# Patient Record
Sex: FEMALE | Race: WHITE | NOT HISPANIC OR LATINO | Employment: FULL TIME
[De-identification: names, ages, dates, MRNs, and addresses within clinical notes are randomized per-mention and may not be internally consistent; named-entity substitution may affect disease eponyms.]

---

## 2017-07-14 ENCOUNTER — RECORDS - HEALTHEAST (OUTPATIENT)
Dept: ADMINISTRATIVE | Facility: OTHER | Age: 32
End: 2017-07-14

## 2018-02-02 ENCOUNTER — RECORDS - HEALTHEAST (OUTPATIENT)
Dept: LAB | Facility: CLINIC | Age: 33
End: 2018-02-02

## 2018-02-05 LAB
BACTERIA SPEC CULT: ABNORMAL
BACTERIA SPEC CULT: ABNORMAL

## 2018-04-25 ENCOUNTER — RECORDS - HEALTHEAST (OUTPATIENT)
Dept: LAB | Facility: CLINIC | Age: 33
End: 2018-04-25

## 2018-04-25 LAB
BASOPHILS # BLD AUTO: 0 THOU/UL (ref 0–0.2)
BASOPHILS NFR BLD AUTO: 0 % (ref 0–2)
EOSINOPHIL # BLD AUTO: 0.1 THOU/UL (ref 0–0.4)
EOSINOPHIL NFR BLD AUTO: 1 % (ref 0–6)
ERYTHROCYTE [DISTWIDTH] IN BLOOD BY AUTOMATED COUNT: 12.3 % (ref 11–14.5)
HCT VFR BLD AUTO: 39.2 % (ref 35–47)
HGB BLD-MCNC: 12.9 G/DL (ref 12–16)
HIV 1+2 AB+HIV1 P24 AG SERPL QL IA: NEGATIVE
LYMPHOCYTES # BLD AUTO: 2.1 THOU/UL (ref 0.8–4.4)
LYMPHOCYTES NFR BLD AUTO: 22 % (ref 20–40)
MCH RBC QN AUTO: 30.5 PG (ref 27–34)
MCHC RBC AUTO-ENTMCNC: 32.9 G/DL (ref 32–36)
MCV RBC AUTO: 93 FL (ref 80–100)
MONOCYTES # BLD AUTO: 0.7 THOU/UL (ref 0–0.9)
MONOCYTES NFR BLD AUTO: 7 % (ref 2–10)
NEUTROPHILS # BLD AUTO: 6.6 THOU/UL (ref 2–7.7)
NEUTROPHILS NFR BLD AUTO: 70 % (ref 50–70)
PLATELET # BLD AUTO: 329 THOU/UL (ref 140–440)
PMV BLD AUTO: 10.8 FL (ref 8.5–12.5)
RBC # BLD AUTO: 4.23 MILL/UL (ref 3.8–5.4)
WBC: 9.4 THOU/UL (ref 4–11)

## 2018-04-26 LAB
ABO/RH(D): NORMAL
ABORH REPEAT: NORMAL
ANTIBODY SCREEN: NEGATIVE
BACTERIA SPEC CULT: NO GROWTH
HBV SURFACE AG SERPL QL IA: NEGATIVE
HCV AB SERPL QL IA: NEGATIVE
RUBV IGG SERPL QL IA: POSITIVE
T PALLIDUM AB SER QL: NEGATIVE

## 2018-05-25 ENCOUNTER — RECORDS - HEALTHEAST (OUTPATIENT)
Dept: LAB | Facility: CLINIC | Age: 33
End: 2018-05-25

## 2018-05-29 LAB
C TRACH DNA SPEC QL PROBE+SIG AMP: NEGATIVE
N GONORRHOEA DNA SPEC QL NAA+PROBE: NEGATIVE

## 2018-06-20 ENCOUNTER — RECORDS - HEALTHEAST (OUTPATIENT)
Dept: LAB | Facility: CLINIC | Age: 33
End: 2018-06-20

## 2018-06-22 LAB
AFP MOM: 0.67 MOM
ALPHA FETO PROTEIN: 23.9 NG/ML
CALCULATED AGE AT EDD: NORMAL
COLLECTION DATE: NORMAL
CURRENT CIGARETTE SMOKING STATUS: NORMAL
DOWN SYNDROME MATERNAL AGE RISK: NORMAL
DOWN SYNDROME SCREEN RISK ESTIMATE: NORMAL
EDD BY LMP: NORMAL
GA ON COLLECTION BY DATES: NORMAL WK,D
GA USED IN RISK ESTIMATE: NORMAL
GENERAL TEST INFORMATION: NORMAL
HCG, TOTAL MOM: 0.62 MOM
HCG, TOTAL: 24.3 IU/ML
INHIBIN MOM: 0.5 MOM
INHIBIN: 88 PG/ML
INITIAL OR REPEAT TESTING: NORMAL
INSULIN DIABETIC: NO
INTERPRETATION: NORMAL
IVF PREGNANCY: NO
Lab: NORMAL
NEURAL TUBE DEFECT RISK ESTIMATE: NORMAL
NUMBER OF CHORIONS: NORMAL
NUMBER OF FETUSES:: 1
PATIENT OR FATHER OF BABY HAS A NTD: NORMAL
PATIENT WEIGHT: 127 LBS
PHYSICIAN PHONE NUMBER: NORMAL
PREV DOWN (T21) / TRISOMY PREGNANCY: NO
PREV PREGNANCY W/ NEURAL TUBE DEFECT: NO
PT DATE OF BIRTH: NORMAL
RACE OF MOTHER: NORMAL
RECOMMENDED FOLLOW UP: NORMAL
TRISOMY 18 SCREEN RISK ESTIMATE: NORMAL
UE3 MOM: 1.17 MOM
UE3: 0.9 NG/ML

## 2018-08-14 ENCOUNTER — RECORDS - HEALTHEAST (OUTPATIENT)
Dept: ADMINISTRATIVE | Facility: OTHER | Age: 33
End: 2018-08-14

## 2018-08-21 ENCOUNTER — RECORDS - HEALTHEAST (OUTPATIENT)
Dept: LAB | Facility: CLINIC | Age: 33
End: 2018-08-21

## 2018-08-22 LAB — T PALLIDUM AB SER QL: NEGATIVE

## 2018-11-09 ENCOUNTER — RECORDS - HEALTHEAST (OUTPATIENT)
Dept: ADMINISTRATIVE | Facility: OTHER | Age: 33
End: 2018-11-09

## 2018-11-16 ENCOUNTER — RECORDS - HEALTHEAST (OUTPATIENT)
Dept: ADMINISTRATIVE | Facility: OTHER | Age: 33
End: 2018-11-16

## 2018-11-27 ENCOUNTER — RECORDS - HEALTHEAST (OUTPATIENT)
Dept: ADMINISTRATIVE | Facility: OTHER | Age: 33
End: 2018-11-27

## 2018-11-27 ENCOUNTER — ANESTHESIA - HEALTHEAST (OUTPATIENT)
Dept: OBGYN | Facility: CLINIC | Age: 33
End: 2018-11-27

## 2018-11-28 ENCOUNTER — SURGERY - HEALTHEAST (OUTPATIENT)
Dept: OBGYN | Facility: CLINIC | Age: 33
End: 2018-11-28

## 2018-11-28 ASSESSMENT — MIFFLIN-ST. JEOR: SCORE: 1387.81

## 2018-11-30 ENCOUNTER — HOME CARE/HOSPICE - HEALTHEAST (OUTPATIENT)
Dept: HOME HEALTH SERVICES | Facility: HOME HEALTH | Age: 33
End: 2018-11-30

## 2018-12-03 ENCOUNTER — HOME CARE/HOSPICE - HEALTHEAST (OUTPATIENT)
Dept: HOME HEALTH SERVICES | Facility: HOME HEALTH | Age: 33
End: 2018-12-03

## 2020-08-12 ENCOUNTER — RECORDS - HEALTHEAST (OUTPATIENT)
Dept: LAB | Facility: CLINIC | Age: 35
End: 2020-08-12

## 2020-08-12 LAB
CHOLEST SERPL-MCNC: 173 MG/DL
FASTING STATUS PATIENT QL REPORTED: NORMAL
HDLC SERPL-MCNC: 61 MG/DL
LDLC SERPL CALC-MCNC: 100 MG/DL
TRIGL SERPL-MCNC: 60 MG/DL

## 2020-08-13 LAB
HPV SOURCE: NORMAL
HUMAN PAPILLOMA VIRUS 16 DNA: NEGATIVE
HUMAN PAPILLOMA VIRUS 18 DNA: NEGATIVE
HUMAN PAPILLOMA VIRUS FINAL DIAGNOSIS: NORMAL
HUMAN PAPILLOMA VIRUS OTHER HR: NEGATIVE
SPECIMEN DESCRIPTION: NORMAL

## 2021-06-02 VITALS — HEIGHT: 66 IN | BODY MASS INDEX: 24.59 KG/M2 | WEIGHT: 153 LBS

## 2021-06-16 PROBLEM — Z87.59 CESAREAN DELIV DUE TO PREVIOUS DIFFICULT DELIV, DELIV, CURR HOSPITALIZ: Status: ACTIVE | Noted: 2018-11-28

## 2021-06-20 ENCOUNTER — HEALTH MAINTENANCE LETTER (OUTPATIENT)
Age: 36
End: 2021-06-20

## 2021-06-22 NOTE — ANESTHESIA CARE TRANSFER NOTE
Last vitals:   Vitals:    11/28/18 0901   BP: 115/62   Pulse: 78   Resp: 18   Temp: 36.7  C (98.1  F)   SpO2: 98%     Patient's level of consciousness is awake and drowsy  Spontaneous respirations: yes  Maintains airway independently: yes  Dentition unchanged: yes  Oropharynx: oropharynx clear of all foreign objects    QCDR Measures:  ASA# 20 - Surgical Safety Checklist: WHO surgical safety checklist completed prior to induction    PQRS# 430 - Adult PONV Prevention: 4558F-8P - Pt did NOT receive => 2 anti-emetic agents  ASA# 8 - Peds PONV Prevention: NA - Not pediatric patient, not GA or 2 or more risk factors NOT present  PQRS# 424 - [place]-op Temp Management: 4559F - At least one body temp DOCUMENTED => 35.5C or 95.9F within required timeframe  PQRS# 426 - PACU Transfer Protocol: - Transfer of care checklist used  ASA# 14 - Acute Post-op Pain: ASA14B - Patient did NOT experience pain >= 7 out of 10

## 2021-06-22 NOTE — ANESTHESIA PROCEDURE NOTES
Spinal Block    Patient location during procedure: OR  Start time: 11/28/2018 8:01 AM  End time: 11/28/2018 8:03 AM  Reason for block: at surgeon's request and primary anesthetic    Staffing:  Performing  Anesthesiologist: Zelalem Cain MD    Preanesthetic Checklist  Completed: patient identified, risks, benefits, and alternatives discussed, timeout performed, consent obtained, at patient's request, airway assessed, oxygen available, suction available, emergency drugs available and hand hygiene performed  Spinal Block  Patient position: sitting  Prep: ChloraPrep and site prepped and draped  Patient monitoring: heart rate, cardiac monitor, continuous pulse ox and blood pressure  Approach: midline  Location: L3-4  Injection technique: single-shot  Needle type: pencil-tip   Needle gauge: 24 G

## 2021-06-22 NOTE — ANESTHESIA PROCEDURE NOTES
Peripheral Block    Start time: 11/28/2018 8:43 AM  End time: 11/28/2018 8:47 AM  post-op analgesia per surgeon order as noted in medical record  Staffing:  Performing  Anesthesiologist: Zelalem Cain MD  Performing CRNA: King Norris CRNA  Preanesthetic Checklist  Completed: patient identified, site marked, risks, benefits, and alternatives discussed, timeout performed, consent obtained, at patient's request, airway assessed, oxygen available, suction available, emergency drugs available and hand hygiene performed  Peripheral Block  Block type: other, TAP  Prep: ChloraPrep  Patient position: supine  Patient monitoring: cardiac monitor, continuous pulse oximetry, heart rate and blood pressure  Laterality: bilateral, same technique used bilaterally  Injection technique: ultrasound guided    Ultrasound used to visualize needle placement in proximity to nerve being blocked: yes   Permanent ultrasound image captured for medical record  Sterile gel and probe cover used for ultrasound.    Needle  Needle type: Stimuplex   Needle gauge: 20G  Needle length: 4 in  no peripheral nerve catheter placed  Assessment  Injection assessment: no difficulty with injection, negative aspiration for heme, no paresthesia on injection and incremental injection

## 2021-06-22 NOTE — ANESTHESIA POSTPROCEDURE EVALUATION
Patient: Margi Cisneros  REPEAT  SECTION  Anesthesia type: spinal    Patient location: Labor and Delivery  Last vitals:   Vitals:    18 1131   BP:    Pulse: 64   Resp:    Temp:    SpO2: 98%     Post vital signs: stable  Level of consciousness: awake and responds to simple questions  Post-anesthesia pain: pain controlled  Post-anesthesia nausea and vomiting: no  Pulmonary: unassisted, return to baseline  Cardiovascular: stable and blood pressure at baseline  Hydration: adequate  Anesthetic events: no    QCDR Measures:  ASA# 11 - [place]-op Cardiac Arrest: ASA11B - Patient did NOT experience unanticipated cardiac arrest  ASA# 12 - [place]-op Mortality Rate: ASA12B - Patient did NOT die  ASA# 13 - PACU Re-Intubation Rate: NA - No ETT / LMA used for case  ASA# 10 - Composite Anes Safety: ASA10A - No serious adverse event    Additional Notes:

## 2021-06-22 NOTE — ANESTHESIA PREPROCEDURE EVALUATION
Anesthesia Evaluation      Patient summary reviewed   No history of anesthetic complications     Airway   Mallampati: I  Neck ROM: full   Pulmonary - negative ROS and normal exam    breath sounds clear to auscultation                         Cardiovascular - negative ROS  Rhythm: regular  Rate: normal,         Neuro/Psych - negative ROS     Endo/Other    (+) pregnant (Repeat CS Elective)     GI/Hepatic/Renal - negative ROS           Dental - normal exam                        Anesthesia Plan  Planned anesthetic: spinal  TAP block post closure  ASA 2     Anesthetic plan and risks discussed with: patient, spouse and parent/guardian  Anesthesia plan special considerations: antiemetics,   Post-op plan: routine recovery

## 2021-10-11 ENCOUNTER — HEALTH MAINTENANCE LETTER (OUTPATIENT)
Age: 36
End: 2021-10-11

## 2022-07-17 ENCOUNTER — HEALTH MAINTENANCE LETTER (OUTPATIENT)
Age: 37
End: 2022-07-17

## 2022-08-08 ENCOUNTER — LAB REQUISITION (OUTPATIENT)
Dept: LAB | Facility: CLINIC | Age: 37
End: 2022-08-08
Payer: COMMERCIAL

## 2022-08-08 DIAGNOSIS — R63.5 ABNORMAL WEIGHT GAIN: ICD-10-CM

## 2022-08-08 LAB
CHOLEST SERPL-MCNC: 206 MG/DL
HDLC SERPL-MCNC: 74 MG/DL
LDLC SERPL CALC-MCNC: 120 MG/DL
NONHDLC SERPL-MCNC: 132 MG/DL
TRIGL SERPL-MCNC: 60 MG/DL
TSH SERPL DL<=0.005 MIU/L-ACNC: 1.53 UIU/ML (ref 0.3–4.2)

## 2022-08-08 PROCEDURE — 84443 ASSAY THYROID STIM HORMONE: CPT | Mod: ORL | Performed by: FAMILY MEDICINE

## 2022-08-08 PROCEDURE — 80061 LIPID PANEL: CPT | Mod: ORL | Performed by: FAMILY MEDICINE

## 2022-09-25 ENCOUNTER — HEALTH MAINTENANCE LETTER (OUTPATIENT)
Age: 37
End: 2022-09-25

## 2022-10-04 ENCOUNTER — MEDICAL CORRESPONDENCE (OUTPATIENT)
Dept: HEALTH INFORMATION MANAGEMENT | Facility: CLINIC | Age: 37
End: 2022-10-04

## 2022-10-13 ENCOUNTER — TRANSCRIBE ORDERS (OUTPATIENT)
Dept: OTHER | Age: 37
End: 2022-10-13

## 2022-10-13 DIAGNOSIS — Z80.41 FAMILY HISTORY OF MALIGNANT NEOPLASM OF OVARY: Primary | ICD-10-CM

## 2023-08-05 ENCOUNTER — HEALTH MAINTENANCE LETTER (OUTPATIENT)
Age: 38
End: 2023-08-05

## 2024-09-26 ENCOUNTER — HOSPITAL ENCOUNTER (OUTPATIENT)
Facility: CLINIC | Age: 39
Setting detail: OBSERVATION
Discharge: HOME | End: 2024-09-27
Attending: EMERGENCY MEDICINE | Admitting: EMERGENCY MEDICINE
Payer: COMMERCIAL

## 2024-09-26 ENCOUNTER — APPOINTMENT (OUTPATIENT)
Dept: MRI IMAGING | Facility: CLINIC | Age: 39
End: 2024-09-26
Attending: EMERGENCY MEDICINE
Payer: COMMERCIAL

## 2024-09-26 ENCOUNTER — APPOINTMENT (OUTPATIENT)
Dept: CT IMAGING | Facility: CLINIC | Age: 39
End: 2024-09-26
Attending: EMERGENCY MEDICINE
Payer: COMMERCIAL

## 2024-09-26 DIAGNOSIS — H54.3 VISION LOSS, BILATERAL: ICD-10-CM

## 2024-09-26 DIAGNOSIS — H53.2 DOUBLE VISION: ICD-10-CM

## 2024-09-26 DIAGNOSIS — G45.9 TIA (TRANSIENT ISCHEMIC ATTACK): ICD-10-CM

## 2024-09-26 PROBLEM — G43.909 MIGRAINE HEADACHE: Status: ACTIVE | Noted: 2024-09-26

## 2024-09-26 PROBLEM — F41.9 ANXIETY: Status: ACTIVE | Noted: 2024-09-26

## 2024-09-26 PROBLEM — N83.209 CYST OF OVARY: Status: ACTIVE | Noted: 2024-09-26

## 2024-09-26 PROBLEM — N92.6 IRREGULAR PERIODS: Status: ACTIVE | Noted: 2024-09-26

## 2024-09-26 LAB
ANION GAP SERPL CALCULATED.3IONS-SCNC: 14 MMOL/L (ref 7–15)
ATRIAL RATE - MUSE: 77 BPM
BUN SERPL-MCNC: 8.5 MG/DL (ref 6–20)
CALCIUM SERPL-MCNC: 9.9 MG/DL (ref 8.8–10.4)
CHLORIDE SERPL-SCNC: 103 MMOL/L (ref 98–107)
CHOLEST SERPL-MCNC: 220 MG/DL
CREAT SERPL-MCNC: 0.59 MG/DL (ref 0.51–0.95)
DIASTOLIC BLOOD PRESSURE - MUSE: NORMAL MMHG
EGFRCR SERPLBLD CKD-EPI 2021: >90 ML/MIN/1.73M2
ERYTHROCYTE [DISTWIDTH] IN BLOOD BY AUTOMATED COUNT: 12 % (ref 10–15)
EST. AVERAGE GLUCOSE BLD GHB EST-MCNC: 117 MG/DL
GLUCOSE SERPL-MCNC: 93 MG/DL (ref 70–99)
HBA1C MFR BLD: 5.7 %
HCG UR QL: NEGATIVE
HCO3 SERPL-SCNC: 24 MMOL/L (ref 22–29)
HCT VFR BLD AUTO: 38.1 % (ref 35–47)
HDLC SERPL-MCNC: 62 MG/DL
HGB BLD-MCNC: 13.1 G/DL (ref 11.7–15.7)
HOLD SPECIMEN: NORMAL
HOLD SPECIMEN: NORMAL
INTERPRETATION ECG - MUSE: NORMAL
LDLC SERPL CALC-MCNC: 136 MG/DL
MCH RBC QN AUTO: 29.4 PG (ref 26.5–33)
MCHC RBC AUTO-ENTMCNC: 34.4 G/DL (ref 31.5–36.5)
MCV RBC AUTO: 86 FL (ref 78–100)
NONHDLC SERPL-MCNC: 158 MG/DL
P AXIS - MUSE: 54 DEGREES
PLATELET # BLD AUTO: 339 10E3/UL (ref 150–450)
POTASSIUM SERPL-SCNC: 4 MMOL/L (ref 3.4–5.3)
PR INTERVAL - MUSE: 142 MS
QRS DURATION - MUSE: 86 MS
QT - MUSE: 386 MS
QTC - MUSE: 436 MS
R AXIS - MUSE: 60 DEGREES
RBC # BLD AUTO: 4.45 10E6/UL (ref 3.8–5.2)
SODIUM SERPL-SCNC: 141 MMOL/L (ref 135–145)
SYSTOLIC BLOOD PRESSURE - MUSE: NORMAL MMHG
T AXIS - MUSE: 62 DEGREES
TRIGL SERPL-MCNC: 108 MG/DL
TROPONIN T SERPL HS-MCNC: <6 NG/L
VENTRICULAR RATE- MUSE: 77 BPM
WBC # BLD AUTO: 6.8 10E3/UL (ref 4–11)

## 2024-09-26 PROCEDURE — G0378 HOSPITAL OBSERVATION PER HR: HCPCS

## 2024-09-26 PROCEDURE — 250N000013 HC RX MED GY IP 250 OP 250 PS 637: Performed by: EMERGENCY MEDICINE

## 2024-09-26 PROCEDURE — 93005 ELECTROCARDIOGRAM TRACING: CPT | Performed by: INTERNAL MEDICINE

## 2024-09-26 PROCEDURE — 70496 CT ANGIOGRAPHY HEAD: CPT

## 2024-09-26 PROCEDURE — 93005 ELECTROCARDIOGRAM TRACING: CPT | Performed by: EMERGENCY MEDICINE

## 2024-09-26 PROCEDURE — 36415 COLL VENOUS BLD VENIPUNCTURE: CPT | Performed by: EMERGENCY MEDICINE

## 2024-09-26 PROCEDURE — 99285 EMERGENCY DEPT VISIT HI MDM: CPT | Mod: 25

## 2024-09-26 PROCEDURE — 80048 BASIC METABOLIC PNL TOTAL CA: CPT | Performed by: EMERGENCY MEDICINE

## 2024-09-26 PROCEDURE — 84484 ASSAY OF TROPONIN QUANT: CPT | Performed by: EMERGENCY MEDICINE

## 2024-09-26 PROCEDURE — 255N000002 HC RX 255 OP 636: Performed by: EMERGENCY MEDICINE

## 2024-09-26 PROCEDURE — 81025 URINE PREGNANCY TEST: CPT | Performed by: EMERGENCY MEDICINE

## 2024-09-26 PROCEDURE — 120N000001 HC R&B MED SURG/OB

## 2024-09-26 PROCEDURE — A9585 GADOBUTROL INJECTION: HCPCS | Performed by: EMERGENCY MEDICINE

## 2024-09-26 PROCEDURE — 80061 LIPID PANEL: CPT | Performed by: INTERNAL MEDICINE

## 2024-09-26 PROCEDURE — 250N000011 HC RX IP 250 OP 636: Performed by: EMERGENCY MEDICINE

## 2024-09-26 PROCEDURE — 85027 COMPLETE CBC AUTOMATED: CPT | Performed by: EMERGENCY MEDICINE

## 2024-09-26 PROCEDURE — 70498 CT ANGIOGRAPHY NECK: CPT

## 2024-09-26 PROCEDURE — 70553 MRI BRAIN STEM W/O & W/DYE: CPT

## 2024-09-26 PROCEDURE — 99222 1ST HOSP IP/OBS MODERATE 55: CPT | Performed by: INTERNAL MEDICINE

## 2024-09-26 PROCEDURE — 83036 HEMOGLOBIN GLYCOSYLATED A1C: CPT | Performed by: INTERNAL MEDICINE

## 2024-09-26 RX ORDER — ASPIRIN 81 MG/1
81 TABLET, CHEWABLE ORAL DAILY
Status: DISCONTINUED | OUTPATIENT
Start: 2024-09-27 | End: 2024-09-26

## 2024-09-26 RX ORDER — IOPAMIDOL 755 MG/ML
75 INJECTION, SOLUTION INTRAVASCULAR ONCE
Status: COMPLETED | OUTPATIENT
Start: 2024-09-26 | End: 2024-09-26

## 2024-09-26 RX ORDER — ASPIRIN 81 MG/1
81 TABLET, CHEWABLE ORAL ONCE
Status: COMPLETED | OUTPATIENT
Start: 2024-09-26 | End: 2024-09-26

## 2024-09-26 RX ORDER — GADOBUTROL 604.72 MG/ML
6.5 INJECTION INTRAVENOUS ONCE
Status: COMPLETED | OUTPATIENT
Start: 2024-09-26 | End: 2024-09-26

## 2024-09-26 RX ORDER — CLOPIDOGREL BISULFATE 75 MG/1
300 TABLET ORAL ONCE
Status: COMPLETED | OUTPATIENT
Start: 2024-09-26 | End: 2024-09-26

## 2024-09-26 RX ORDER — SODIUM CHLORIDE 9 MG/ML
INJECTION, SOLUTION INTRAVENOUS CONTINUOUS
Status: DISCONTINUED | OUTPATIENT
Start: 2024-09-26 | End: 2024-09-26

## 2024-09-26 RX ORDER — LIDOCAINE 40 MG/G
CREAM TOPICAL
Status: DISCONTINUED | OUTPATIENT
Start: 2024-09-26 | End: 2024-09-27 | Stop reason: HOSPADM

## 2024-09-26 RX ORDER — ASPIRIN 325 MG
325 TABLET ORAL ONCE
Status: DISCONTINUED | OUTPATIENT
Start: 2024-09-26 | End: 2024-09-26

## 2024-09-26 RX ORDER — ASPIRIN 81 MG/1
81 TABLET, CHEWABLE ORAL DAILY
Status: DISCONTINUED | OUTPATIENT
Start: 2024-09-27 | End: 2024-09-27 | Stop reason: HOSPADM

## 2024-09-26 RX ORDER — CLOPIDOGREL BISULFATE 75 MG/1
75 TABLET ORAL DAILY
Status: DISCONTINUED | OUTPATIENT
Start: 2024-09-27 | End: 2024-09-27 | Stop reason: HOSPADM

## 2024-09-26 RX ORDER — ASPIRIN 81 MG/1
81 TABLET ORAL DAILY
Status: DISCONTINUED | OUTPATIENT
Start: 2024-09-27 | End: 2024-09-26

## 2024-09-26 RX ORDER — ESCITALOPRAM OXALATE 10 MG/1
10 TABLET ORAL AT BEDTIME
COMMUNITY

## 2024-09-26 RX ADMIN — GADOBUTROL 6.5 ML: 604.72 INJECTION INTRAVENOUS at 23:30

## 2024-09-26 RX ADMIN — CLOPIDOGREL BISULFATE 300 MG: 75 TABLET ORAL at 21:47

## 2024-09-26 RX ADMIN — IOPAMIDOL 75 ML: 755 INJECTION, SOLUTION INTRAVENOUS at 18:37

## 2024-09-26 RX ADMIN — ASPIRIN 81 MG CHEWABLE TABLET 81 MG: 81 TABLET CHEWABLE at 21:47

## 2024-09-26 ASSESSMENT — COLUMBIA-SUICIDE SEVERITY RATING SCALE - C-SSRS
6. HAVE YOU EVER DONE ANYTHING, STARTED TO DO ANYTHING, OR PREPARED TO DO ANYTHING TO END YOUR LIFE?: NO
2. HAVE YOU ACTUALLY HAD ANY THOUGHTS OF KILLING YOURSELF IN THE PAST MONTH?: NO
1. IN THE PAST MONTH, HAVE YOU WISHED YOU WERE DEAD OR WISHED YOU COULD GO TO SLEEP AND NOT WAKE UP?: NO

## 2024-09-26 ASSESSMENT — ACTIVITIES OF DAILY LIVING (ADL)
ADLS_ACUITY_SCORE: 35
ADLS_ACUITY_SCORE: 35

## 2024-09-26 NOTE — ED PROVIDER NOTES
EMERGENCY DEPARTMENT ENCOUNTER      NAME: Margi Cisneros  AGE: 39 year old female  YOB: 1985  MRN: [id number]  EVALUATION DATE & TIME: No admission date for patient encounter.    PCP: Tucker Rich    ED PROVIDER: Mario Reyes M.D.      Chief Complaint   Patient presents with    Loss of Vision         FINAL IMPRESSION:  1. TIA (transient ischemic attack)    2. Vision loss, bilateral    3. Double vision          ED COURSE & MEDICAL DECISION MAKING:    Pertinent Labs & Imaging studies reviewed. (See chart for details)  ED Course as of 09/26/24 2122   Thu Sep 26, 2024   1752 Patient is a 39-year-old woman that I was called out to triage to see out of concern for stroke.  She presents with sudden onset of painless bilateral vision loss that works like curtains falling down.  She had subsequent 10 to 15 minutes of horizontal diplopia, and that is also subsided.  She is asymptomatic currently.  NIH stroke scale of 0 on arrival.  Given bilateral nature of symptoms, concerning for central etiology.  Large aneurysm, mass in the differential although given her symptoms seem to come on very abruptly.  She has no headache right now.  We will start with a CT/CTA of head and neck and then reach out to stroke neurology for any recommendations   1807 HCG Qual Urine: Negative   1822 Troponin T, High Sensitivity: <6   1822 Basic metabolic panel  WNL   1914 EKG shows sinus rhythm with a rate of 77.  No acute ischemic ST or T wave morphology.  Normal axis, normal intervals, no prior EKG for comparison.  Impression: Sinus rhythm without evidence of acute ischemia   2008 CTA Head Neck with Contrast  HEAD CT:  1.  No acute intracranial process.     HEAD CTA:   1.  No significant stenosis, aneurysm, or high flow vascular malformation identified.  2.  Variant [place] anatomy as above.     NECK CTA:  1.  Normal neck CTA.   2045 I spoke to stroke neuro   Patient will be admitted to the hospital for TIA.  MRI  will be obtained, this does not need to be done emergently overnight, but can wait till 6 AM when the day crew arrived.  This was communicated over to the MRI department.  In the meantime, I ordered aspirin and Plavix per recommendations from stroke neurology.  They placed a note for further recommendations.     Medical Decision Making  Obtained supplemental history:Supplemental history obtained?: No  Reviewed external records: Documented in chart  Care impacted by chronic illness:Documented in Chart  Care significantly affected by social determinants of health:N/A  Did you consider but not order tests?: Work up considered but not performed and documented in chart, if applicable  Did you interpret images independently?: Independent interpretation of ECG and images noted in documentation, when applicable.  Consultation discussion with other provider: See documentation for phone consultations  Admit.    Not Applicable        MEDICATIONS GIVEN IN THE EMERGENCY:  Medications   clopidogrel (PLAVIX) tablet 300 mg (has no administration in time range)   aspirin (ASA) chewable tablet 81 mg (has no administration in time range)   lidocaine 1 % 0.1-1 mL (has no administration in time range)   lidocaine (LMX4) cream (has no administration in time range)   sodium chloride (PF) 0.9% PF flush 3 mL (has no administration in time range)   sodium chloride (PF) 0.9% PF flush 3 mL (has no administration in time range)   Medication Instruction - Avoid dextrose in IV solutions (has no administration in time range)   sodium chloride 0.9 % infusion (has no administration in time range)   clopidogrel (PLAVIX) tablet 75 mg (has no administration in time range)   niCARdipine 40 mg in 200 mL NS (CARDENE) infusion (has no administration in time range)   iopamidol (ISOVUE-370) solution 75 mL (75 mLs Intravenous $Given 9/26/24 0439)         NEW PRESCRIPTIONS STARTED AT TODAY'S ER VISIT  New Prescriptions    No medications on file       "    =================================================================    HPI      Margi Cisneros is a 39 year old female who presents to this ED for evaluation of visual changes.  At 1530, 2-1/2 hours ago, patient felt sudden onset of bilateral vision loss that she experienced as curtains dropping suddenly.  She did not feel dizzy, lightheaded, have a headache or any other symptoms at that time.  She had to sit down, blink, and the vision slowly improved over the next couple of minutes.  However, for the next 10 to 30 minutes she had double vision, oriented horizontally.  She has never had symptoms like this before.  She wears contact lenses.  After the double vision improved, she has remained asymptomatic.    She takes escitalopram, no other medications.  Otherwise healthy.        VITALS:  BP (!) 146/90   Pulse 93   Temp 99.1  F (37.3  C) (Temporal)   Resp 18   Ht 1.664 m (5' 5.5\")   Wt 65.8 kg (145 lb)   SpO2 98%   BMI 23.76 kg/m      PHYSICAL EXAM    Constitutional: Calm, comfortable  HENT: Normocephalic, atraumatic, mucous membranes moist, nose normal  Eyes: PERRL, EOMI, conjunctiva normal, no discharge.  Neck- Supple, gross ROM intact.   Respiratory: Normal work of breathing, normal rate, speaks in full sentences  Cardiovascular: Normal heart rate  Musculoskeletal: Moving all 4 extremities intentionally and without pain.  Neurologic: CN II-XII intact. 5/5 strength in upper and lower extremities. No dysarthria or dysphagia. Sensation to light touch intact in all 4 extremities. No pronator drift.  Normal finger-nose-finger. Steady gait.  Psychiatric: Affect normal, cooperative.    National Institutes of Health Stroke Scale  Exam Interval: Baseline   Score    Level of consciousness: (0)   Alert, keenly responsive    LOC questions: (0)   Answers both questions correctly    LOC commands: (0)   Performs both tasks correctly    Best gaze: (0)   Normal    Visual: (0)   No visual loss    Facial palsy: (0)   " Normal symmetrical movements    Motor arm (left): (0)   No drift    Motor arm (right): (0)   No drift    Motor leg (left): (0)   No drift    Motor leg (right): (0)   No drift    Limb ataxia: (0)   Absent    Sensory: (0)   Normal- no sensory loss    Best language: (0)   Normal- no aphasia    Dysarthria: (0)   Normal    Extinction and inattention: (0)   No abnormality        Total Score:  0          LAB:  All pertinent labs reviewed and interpreted.  Labs Ordered and Resulted from Time of ED Arrival to Time of ED Departure   CBC WITH PLATELETS - Normal       Result Value    WBC Count 6.8      RBC Count 4.45      Hemoglobin 13.1      Hematocrit 38.1      MCV 86      MCH 29.4      MCHC 34.4      RDW 12.0      Platelet Count 339     BASIC METABOLIC PANEL - Normal    Sodium 141      Potassium 4.0      Chloride 103      Carbon Dioxide (CO2) 24      Anion Gap 14      Urea Nitrogen 8.5      Creatinine 0.59      GFR Estimate >90      Calcium 9.9      Glucose 93     TROPONIN T, HIGH SENSITIVITY - Normal    Troponin T, High Sensitivity <6     HCG QUALITATIVE URINE - Normal    hCG Urine Qualitative Negative     GLUCOSE MONITOR NURSING POCT   GLUCOSE MONITOR NURSING POCT   HEMOGLOBIN A1C   LIPID REFLEX TO DIRECT LDL PANEL       RADIOLOGY:  Reviewed all pertinent imaging. Please see official radiology report.  CTA Head Neck with Contrast   Final Result   IMPRESSION:    HEAD CT:   1.  No acute intracranial process.      HEAD CTA:    1.  No significant stenosis, aneurysm, or high flow vascular malformation identified.   2.  Variant [place] anatomy as above.      NECK CTA:   1.  Normal neck CTA.      MR Brain w/o & w Contrast    (Results Pending)       EKG:    All EKG interpretations will be found in ED course above.      Mario Reyes M.D.  Emergency Medicine  [facility] EMERGENCY ROOM  [address] [address] [phone]  Dept:  [phone]       Mario Reyes MD  09/26/24 6493

## 2024-09-26 NOTE — ED TRIAGE NOTES
Pt was at ice cream store standing and noticed her vision blackened like a curtain dropped in front of both eyes with no other symptoms. Vision was gone for approx 30 seconds and then returned as double vision lasting approx 5-10 min with pockets of black. Evaluated at that time by PA friend who didn't see any signs of stroke. Vision now back to normal. Called her eye doctor office and optometrist responded since vision returned and no spots anymore to continue to monitor for flashes, dark black spots to call back.  Called PCP office and told to go to ER now. Pt alert. No HA, no nausea, no dizziness, no weakness, no fatigue, no CP, SOB.      Triage Assessment (Adult)       Row Name 09/26/24 6030          Triage Assessment    Airway WDL WDL        Respiratory WDL    Respiratory WDL WDL        Skin Circulation/Temperature WDL    Skin Circulation/Temperature WDL WDL        Cardiac WDL    Cardiac WDL WDL        Peripheral/Neurovascular WDL    Peripheral Neurovascular WDL WDL        Cognitive/Neuro/Behavioral WDL    Cognitive/Neuro/Behavioral WDL WDL

## 2024-09-27 ENCOUNTER — APPOINTMENT (OUTPATIENT)
Dept: CARDIOLOGY | Facility: CLINIC | Age: 39
End: 2024-09-27
Payer: COMMERCIAL

## 2024-09-27 VITALS
WEIGHT: 145 LBS | BODY MASS INDEX: 23.3 KG/M2 | HEART RATE: 76 BPM | OXYGEN SATURATION: 98 % | TEMPERATURE: 98 F | RESPIRATION RATE: 20 BRPM | SYSTOLIC BLOOD PRESSURE: 113 MMHG | HEIGHT: 66 IN | DIASTOLIC BLOOD PRESSURE: 64 MMHG

## 2024-09-27 LAB
ANION GAP SERPL CALCULATED.3IONS-SCNC: 14 MMOL/L (ref 7–15)
APTT PPP: 31 SECONDS (ref 22–38)
BUN SERPL-MCNC: 7.8 MG/DL (ref 6–20)
CALCIUM SERPL-MCNC: 9.1 MG/DL (ref 8.8–10.4)
CHLORIDE SERPL-SCNC: 102 MMOL/L (ref 98–107)
CREAT SERPL-MCNC: 0.58 MG/DL (ref 0.51–0.95)
EGFRCR SERPLBLD CKD-EPI 2021: >90 ML/MIN/1.73M2
ERYTHROCYTE [DISTWIDTH] IN BLOOD BY AUTOMATED COUNT: 11.9 % (ref 10–15)
GLUCOSE BLDC GLUCOMTR-MCNC: 91 MG/DL (ref 70–99)
GLUCOSE SERPL-MCNC: 94 MG/DL (ref 70–99)
HCO3 SERPL-SCNC: 24 MMOL/L (ref 22–29)
HCT VFR BLD AUTO: 37.9 % (ref 35–47)
HGB BLD-MCNC: 12.8 G/DL (ref 11.7–15.7)
INR PPP: 1.04 (ref 0.85–1.15)
MCH RBC QN AUTO: 29.5 PG (ref 26.5–33)
MCHC RBC AUTO-ENTMCNC: 33.8 G/DL (ref 31.5–36.5)
MCV RBC AUTO: 87 FL (ref 78–100)
PLATELET # BLD AUTO: 329 10E3/UL (ref 150–450)
POTASSIUM SERPL-SCNC: 4 MMOL/L (ref 3.4–5.3)
RBC # BLD AUTO: 4.34 10E6/UL (ref 3.8–5.2)
SODIUM SERPL-SCNC: 140 MMOL/L (ref 135–145)
WBC # BLD AUTO: 6 10E3/UL (ref 4–11)

## 2024-09-27 PROCEDURE — 999N000208 ECHOCARDIOGRAM COMPLETE

## 2024-09-27 PROCEDURE — 36415 COLL VENOUS BLD VENIPUNCTURE: CPT | Performed by: INTERNAL MEDICINE

## 2024-09-27 PROCEDURE — 82962 GLUCOSE BLOOD TEST: CPT

## 2024-09-27 PROCEDURE — 93306 TTE W/DOPPLER COMPLETE: CPT | Mod: 26 | Performed by: INTERNAL MEDICINE

## 2024-09-27 PROCEDURE — 250N000013 HC RX MED GY IP 250 OP 250 PS 637: Performed by: INTERNAL MEDICINE

## 2024-09-27 PROCEDURE — 85610 PROTHROMBIN TIME: CPT | Performed by: INTERNAL MEDICINE

## 2024-09-27 PROCEDURE — 99239 HOSP IP/OBS DSCHRG MGMT >30: CPT | Performed by: HOSPITALIST

## 2024-09-27 PROCEDURE — 85730 THROMBOPLASTIN TIME PARTIAL: CPT | Performed by: INTERNAL MEDICINE

## 2024-09-27 PROCEDURE — 80048 BASIC METABOLIC PNL TOTAL CA: CPT | Performed by: INTERNAL MEDICINE

## 2024-09-27 PROCEDURE — G0378 HOSPITAL OBSERVATION PER HR: HCPCS

## 2024-09-27 PROCEDURE — G0426 INPT/ED TELECONSULT50: HCPCS | Mod: G0

## 2024-09-27 PROCEDURE — 85027 COMPLETE CBC AUTOMATED: CPT | Performed by: INTERNAL MEDICINE

## 2024-09-27 RX ORDER — ASPIRIN 81 MG/1
81 TABLET, CHEWABLE ORAL DAILY
Qty: 30 TABLET | Refills: 0 | Status: SHIPPED | OUTPATIENT
Start: 2024-09-28

## 2024-09-27 RX ORDER — CLOPIDOGREL BISULFATE 75 MG/1
75 TABLET ORAL DAILY
Qty: 21 TABLET | Refills: 0 | Status: SHIPPED | OUTPATIENT
Start: 2024-09-28 | End: 2024-10-19

## 2024-09-27 RX ADMIN — ASPIRIN 81 MG CHEWABLE TABLET 81 MG: 81 TABLET CHEWABLE at 10:21

## 2024-09-27 RX ADMIN — CLOPIDOGREL BISULFATE 75 MG: 75 TABLET ORAL at 10:22

## 2024-09-27 ASSESSMENT — ACTIVITIES OF DAILY LIVING (ADL)
ADLS_ACUITY_SCORE: 35

## 2024-09-27 NOTE — INTERIM SUMMARY
"[facility]    Brief Stroke Telephone Note    See stroke consult note from earlier today for HPI and recommendations. Additional recommendations below:     - As discussed with patient, recommend holding off on initiating hormone replacement therapy pending additional TIA work up    Case discussed with vascular neurology attending Dr. Farias.    Kim Jensen PA-C  Vascular Neurology    To page me or covering stroke neurology team member, click here: AMCOM  Choose \"On Call\" tab at top, then select \"NEUROLOGY/ALL SITES\" from middle drop-down box, press Enter, then look for \"stroke\" or \"telestroke\" for your site.   "

## 2024-09-27 NOTE — PHARMACY-ADMISSION MEDICATION HISTORY
Pharmacist Admission Medication History    Admission medication history is complete. The information provided in this note is only as accurate as the sources available at the time of the update.    Information Source(s): Patient and CareEverywhere/SureScripts via in-person    Pertinent Information:     Changes made to PTA medication list:  Added: esccitalopram   Deleted: ibuprofen, oxycodone, prenatal vitamins  Changed: None    Allergies reviewed with patient and updates made in EHR: yes    Medication History Completed By: Lucia Toribio RPH 9/26/2024 9:29 PM    PTA Med List   Medication Sig Last Dose    escitalopram (LEXAPRO) 10 MG tablet Take 10 mg by mouth at bedtime. 9/25/2024 at pm

## 2024-09-27 NOTE — PROGRESS NOTES
Physical Therapy: Orders received. Chart reviewed and discussed with care team.? Physical Therapy not indicated due to RN reports pt has no mobility/PT concerns. Pt does not need PT services.? Defer discharge recommendations to nursing and care team.? Will complete orders.   Blessing Saucedo, PT, DPT

## 2024-09-27 NOTE — PLAN OF CARE
Problem: Adult Inpatient Plan of Care  Goal: Absence of Hospital-Acquired Illness or Injury  Intervention: Prevent Skin Injury  Recent Flowsheet Documentation  Taken 9/27/2024 0457 by Gaby Woodard RN  Body Position:   turned   position changed independently     Problem: Adult Inpatient Plan of Care  Goal: Absence of Hospital-Acquired Illness or Injury  Intervention: Prevent Infection  Recent Flowsheet Documentation  Taken 9/27/2024 0457 by Gaby Woodard, RN  Infection Prevention: cohorting utilized   Patient's vitals stable on RA. Denies any pain. Patient's vision has returned to normal per patient report. Plan for neurology consult later today.

## 2024-09-27 NOTE — PROGRESS NOTES
09/27/24 0900   Appointment Info   Signing Clinician's Name / Credentials (SLP) RASHID Trujillo   Appointment Canceled Reason (SLP) Other (see Cancel Comments row)   Appointment Cancel Comments (SLP) Stroke Pathway orders recieved and appreciated. Per chart review and nurse interview, no need for SLP evaluation, therefore it will be deferred.

## 2024-09-27 NOTE — CONSULTS
"[facility]    Stroke Consult Note    Reason for Consult:  Loss of vision    Chief Complaint: Loss of Vision     HPI  Margi Cisneros is a right handed 39 year old female with a PMH significant for migraine and anxiety. Margi presented on 9/26 with new visual disturbance.  Margi reports that  yesterday she was at an ice cream shop and when attempting to use her phone to pay at the counter she noted new vision loss as if a black curtain felll in front of both eyes.  She denies any change in head position prior to the onset of symptoms. It is estimated this black curtain vision loss lasted 30 seconds before suddenly resolving.  Denied seeing the curtain gradually lift up.  After resolving she noted her vision came back with new double vision and black spots.  She noted horizontal double vision occurred in her right eye and had new black spots in her left eye.  She described her vision changes as \"camera lens zooming in and out as if it was trying to focused.\"  She estimates these visual changes occurred for 5 to 10 minutes before resolving. She endorsed associated symptoms of feeling disoriented and panic.  She denied any associated lightheadedness, eye pain, nausea, unilateral weakness, numbness, changes in her speech, or associated headache during or following the onset of symptoms.  She also denied chest pain, shortness of breath, palpitation. She endorsed a brief episode of room spinning sensation. She was able to ambulate independently back to a chair to sit down.  She denies prior episodes of her presenting symptoms. Not on aspirin or statin PTA. Presenting BP was 146/90.    Today, Margi endorses continued resolution of her visual symptoms and denies any new neurologic symptoms.  Regarding her prior history of headache, she endorses 1 migraine per month over the last 4 months.  She denies any associated aura.  The pain is global and does not localize to one side.  Pain is typically " relieved with Advil.    Reviewed Vascular Risk Factors:   - Tobacco use: Non-smoker.   - Family history of stroke/TIA: Maternal grandmother had a TIA in ~81 yo   - Family hx of blood clot: Father had a PE  that was provoked after a surgery   - Family hx or personal history of recurrent miscarriage: No   - No current hormone replacement usage, though is undergoing an endocrinologic workup for premature menopause and is considering future hormone replacement therapy  - Diet: Breakfast: Toast; lunch: Apple, cheese stick, crackers; Dinner: varies. Noted she did not eat much food yesterday.  - Exercise: Typically exercise 3-4 times a week at the gym, cardio/weight lifting.   - Blood pressure: No known hx   - Diabetes: No known hx   - Atrial fibrillation: Father has a hx of atrial fibrillation which was reportedly diagnosed at a young age, No personal history of A-fib  - B symptoms: Denies unintentional weight loss, fatigue, night sweats.   - Endorses fever/chills due to recent cold this past Saturday which resolved on Sunday.   - Pre-cancer screenings: Up to date on Mammogram   - MARILEE Screening questions: Denies snoring or daytime sleepiness    Neuro Evaluation Summarized  MRI and/or Head CT MRI Brain: No acute infarct    Intracranial Vasculature CTA Head: No LVO or significant stenosis   Cervical Vasculature CTA Neck: No LVO or significant stenosis      Echocardiogram Normal left ventricular size and systolic performance. EF is 60%. No significant valvular heart disease is identified on this study. Normal right ventricular size and systolic performance. Negative bubble. Left atrium and right atrium normal.    EKG/Telemetry Sinus rhythm   Possible Left atrial enlargement   Cannot rule out Anterior infarct , age undetermined    Other Testing Not Applicable      LDL 9/26/2024: 136 mg/dL   A1C 9/26/2024: 5.7 %     ABCD2 Patients Score   Age ? 60 years 1 point 0   Blood Pressure    SBP ? 140 or DBP ?  90    1 point 1    Clinical Features    - Unilateral weakness    - Speech disturbance w/o weakness    - Other    2 points  1 point    0 points 0   Duration of symptoms    ? 60 minutes    10-59 minutes    < 10 minutes   2 points  1 point  0 points 1   Diabetes  1 point 0   Patient s ABCD2 Score (0-7) = 2     Impression  Episode of bilateral transient vision loss followed by monocular horizontal double vision and monocular black spots, unclear etiology. Differential includes transient global hypoperfusion (sudden drop in BP or transient arrhythmia) vs transient ischemic attack vs atypical migraine (?brainstem aura) vs seizure. However, given vascular risk factors recommend treating as TIA and optimizing vascular risk factors and reassessing for recurrence of symptoms and continuation of anti-platelet therapy at follow up      Recommendations   - Neuro checks and vital signs every 4 hours  - Normotension   - Long term goal BP is <130/80 to be achieved as outpatient within several weeks. Tighter control associated with improved vascular outcomes; recommend home blood pressure monitoring and keeping a BP log for primary care follow up  - Continue Plavix 75 mg and aspirin 81mg daily together for 21 days; then aspirin 81 mg alone indefinitely, recommend reassessing need for aspirin continuation at follow up   -  (goal 40-70); Patient has recent discussion with her PCP regarding holding off on statin initiation pending additional work up, defer to PCP.   - Hgb A1c 5.7%, (goal <7% for secondary stroke prevention) regarding pre-diabetes   - Encourage Mediterranean diet and daily exercise  - Euthermia, Euglycemia   - Education: Reviewed [facility] stroke warning signs with Maren  - Reviewed patient's plan for upcoming travel with Dr. Farias who recommends avoiding travel for 1 month   - 14 day cardiac event monitor (Zio Patch) to be mailed to patient, (ordered)    Diagnostic testing  - Continue telemetry while  "inpatient    Patient Follow-up    - in the next 1-2 week(s) with PCP  - in 6-8 weeks with any stroke CHETAN or general neurology ([phone]) (ordered)  - Recommend follow up with outpatient ophthalmology for formal eye exam and visual field testing    Thank you for this consult. No further stroke evaluation is recommended, so we will sign off. Please contact us with any additional questions.    Kim Jensen PA-C  Vascular Neurology    To page me or covering stroke neurology team member, click here: [facility]  Choose \"On Call\" tab at top, then select \"NEUROLOGY/ALL SITES\" from middle drop-down box, press Enter, then look for \"stroke\" or \"telestroke\" for your site.  _____________________________________________________    Clinically Significant Risk Factors Present on Admission                                        Past Medical History    No past medical history on file.  Medications   Home Meds  Prior to Admission medications    Medication Sig Start Date End Date Taking? Authorizing Provider   escitalopram (LEXAPRO) 10 MG tablet Take 10 mg by mouth at bedtime.   Yes Unknown, Entered By History       Scheduled Meds  Current Facility-Administered Medications   Medication Dose Route Frequency Provider Last Rate Last Admin    aspirin (ASA) chewable tablet 81 mg  81 mg Oral Daily Guanaco Del Rosario MD   81 mg at 09/27/24 1021    clopidogrel (PLAVIX) tablet 75 mg  75 mg Oral or NG Tube Daily Guanaco Del Rosario MD   75 mg at 09/27/24 1022    sodium chloride (PF) 0.9% PF flush 3 mL  3 mL Intracatheter Q8H Guanaco Del Rosario MD   3 mL at 09/27/24 0459       Infusion Meds  Current Facility-Administered Medications   Medication Dose Route Frequency Provider Last Rate Last Admin    Medication Instruction - Avoid dextrose in IV solutions   Intravenous Continuous PRN Guanaco Del Rosario MD        niCARdipine 40 mg in 200 mL NS (CARDENE) infusion  2.5-15 mg/hr Intravenous Continuous PRN Guanaco Del Rosario MD           Allergies   No " Known Allergies       PHYSICAL EXAMINATION   Temp:  [98.2  F (36.8  C)-99.1  F (37.3  C)] 98.3  F (36.8  C)  Pulse:  [] 76  Resp:  [16-34] 34  BP: (113-146)/(64-90) 113/64  SpO2:  [97 %-99 %] 98 %    General Exam  General:  patient lying in bed without any acute distress    HEENT:  normocephalic/atraumatic  Pulmonary:  no respiratory distress    Neuro Exam  Mental Status:  alert, oriented to age and month, follows commands, speech clear and fluent, naming and repetition normal  Cranial Nerves:  visual fields intact (tested by nurse), EOMI with normal smooth pursuit, facial sensation intact and symmetric (tested by nurse), facial movements symmetric, hearing not formally tested but intact to conversation, no dysarthria, shoulder shrug equal bilaterally, tongue protrusion midline  Motor:  no abnormal movements, able to move all limbs antigravity spontaneously with no signs of hemiparesis observed, no pronator drift   Reflexes:  unable to test (telestroke)  Sensory:  light touch sensation intact and symmetric throughout upper and lower extremities (assessed by nurse), no extinction on double simultaneous stimulation (assessed by nurse)  Coordination:  normal finger-to-nose and heel-to-shin bilaterally without dysmetria, rapid alternating movements symmetric, no orbiting with arm rolling   Station/Gait:  unable to test due to telestroke    Stroke Scales  NIHSS  1a. Level of Consciousness 0-->Alert, keenly responsive   1b. LOC Questions 0-->Answers both questions correctly   1c. LOC Commands 0-->Performs both tasks correctly   2.   Best Gaze 0-->Normal   3.   Visual 0-->No visual loss   4.   Facial Palsy 0-->Normal symmetrical movements   5a. Motor Arm, Left 0-->No drift, limb holds 90 (or 45) degrees for full 10 secs   5b. Motor Arm, Right 0-->No drift, limb holds 90 (or 45) degrees for full 10 secs   6a. Motor Leg, Left 0-->No drift, leg holds 30 degree position for full 5 secs   6b. Motor Leg, right 0-->No  "drift, leg holds 30 degree position for full 5 secs   7.   Limb Ataxia 0-->Absent   8.   Sensory 0-->Normal, no sensory loss   9.   Best Language 0-->No aphasia, normal   10. Dysarthria 0-->Normal   11. Extinction and Inattention  0-->No abnormality   Total 0 (09/27/24 1100)     Imaging  I personally reviewed all imaging; relevant findings per HPI.    Labs Data   CBC  Recent Labs   Lab 09/27/24  0507 09/26/24  1751   WBC 6.0 6.8   RBC 4.34 4.45   HGB 12.8 13.1   HCT 37.9 38.1    339     Basic Metabolic Panel   Recent Labs   Lab 09/27/24  1205 09/27/24  0507 09/26/24  1751   NA  --  140 141   POTASSIUM  --  4.0 4.0   CHLORIDE  --  102 103   CO2  --  24 24   BUN  --  7.8 8.5   CR  --  0.58 0.59   GLC 91 94 93   AQUILES  --  9.1 9.9     Liver Panel  No results for input(s): \"PROTTOTAL\", \"ALBUMIN\", \"BILITOTAL\", \"ALKPHOS\", \"AST\", \"ALT\", \"BILIDIRECT\" in the last 168 hours.  INR    Recent Labs   Lab Test 09/27/24  0507   INR 1.04           Stroke Consult Data Data   Telestroke Service Details  (for non-emergent stroke consult with tele)  Video start time 09/27/24   1108   Video end time 09/27/24   1151   Type of service telemedicine diagnostic assessment of acute neurological changes   Reason telemedicine is appropriate patient requires assessment with a specialist for diagnosis and treatment of neurological symptoms   Mode of transmission secure interactive audio and video communication per Leo   Originating site (patient location) [facility]    Distant site (provider location) [facility]       I have personally spent a total of 75 minutes providing care today, time spent in reviewing medical records and devising the plan as recorded above.   "

## 2024-09-27 NOTE — H&P
[facility]    History and Physical - Hospitalist Service       Date of Admission:  9/26/2024    Assessment & Plan    Active Problems:    Anxiety    TIA (transient ischemic attack)    Double vision    Vision loss, bilateral      Margi Cisneros is a 39 year old female admitted on 9/26/2024. She has a history of migraine headaches, anxiety, a who presented to the ED with complaints of transient and irregular periods with early menopause currently undergoing workup with endocrinologist who presented today due to complaints of transient bilateral visual loss followed by about 5 to 10-minute episode of double vision.  Patient reports that she was standing in line this afternoon around 230 to 3 PM at an ice [employer] store when her vision went black like a curtain had been pulled in front of her eye.  States that she had a brief episode where she felt like a spinning sensation.  States that that lasted maybe less than a second.  Reports that the visual loss lasted for about 30 seconds but subsequently she had double vision.  Reports that she went to sit down and try not to panic because she had children with her.  States that her friend who is a physician assistant came and assessed  head.  She reports that she had what appears to be peripheral vision loss.  She called her  PCP's elizabeth line and she was instructed to come to the ED.  She denies any focal weakness, loss of sensation, chest pain, speech deficits, shortness of breath, nausea, vomiting, diarrhea, constipation, dysuria, or any other new symptoms.  She reports that she has been dealing with some stressors including work and also a friend who completed suicide recently but there has been nothing that is overwhelming.      #Acute CVA: Likely TIA vs other neurological process like global hypoperfusion. Patient presented with complaints of transient bilateral visual loss followed by about 5 to 10-minute episode of diplopia.. CT brain showed no  acute intracranial findings.  CT was negative for any significant stenosis, admission, or high flow vascular malformation.  Neurochecks   Permissive HTN; goal SBP < 220 mmHg  Daily aspirin 81 mg   Plavix (clopidogrel) 75 mg PO Daily  Statin: atorvastatin 40  MRI Brain with and without contrast   Echocardiogram  Telemetry, EKG  Strict glycemic control   Check Hb A1c and Lipid Panel   PT and OT to evaluate and treat  Speech therapy consult     Neurology Consult  NPO until patient passes bedside swallow eval or speech therapy swallow eval.     # Anxiety:  Continue escitalopram        Diet: NPO for Medical/Clinical Reasons Except for: No Exceptions    DVT Prophylaxis: Ambulate every shift  Mario Catheter: Not present  Lines: None     Cardiac Monitoring: ACTIVE order. Indication: ICU  Code Status: Full Code      Clinically Significant Risk Factors Present on Admission                                     Disposition Plan     Medically Ready for Discharge: Anticipated Tomorrow         Guanaco Del Rosario MD  Hospitalist Service  [facility]  Securely message with [employer] (more info)  Text page via [facility] Paging/Directory     ______________________________________________________________________    Chief Complaint   Bilateral vision loss followed by double vision.     History is obtained from the patient    History of Present Illness   Margi Cisneros is a 39 year old female with medical history significant for of migraine headaches, anxiety, a who presented to the ED with complaints of transient and irregular periods with early menopause currently undergoing workup with endocrinologist who presented today due to complaints of transient bilateral visual loss followed by about 5 to 10-minute episode of double vision.  Patient reports that she was standing in line this afternoon around 230 to 3 PM at an ice cream store when her vision went black like a curtain had been pulled in front of her eye.  States  that she had a brief episode where she felt like a spinning sensation.  States that that lasted maybe less than a second.  Reports that the visual loss lasted for about 30 seconds but subsequently she had double vision.  Reports that she went to sit down and try not to panic because she had children with her.  States that her friend who is a physician assistant came and assessed  head.  She reports that she had what appears to be peripheral vision loss.  She called her  PCP's elizabeth line and she was instructed to come to the ED.  She denies any focal weakness, loss of sensation, chest pain, speech deficits, shortness of breath, nausea, vomiting, diarrhea, constipation, dysuria, or any other new symptoms.  She reports that she has been dealing with some stressors including work and also a friend who completed suicide recently but there has been nothing that is overwhelming.    She reports occasional alcohol intake.  Denies any tobacco or illicit drug use.      Past Medical History    No past medical history on file.    Past Surgical History   Past Surgical History:   Procedure Laterality Date     SECTION N/A 2018    Procedure: REPEAT  SECTION;  Surgeon: Tim Spann MD;  Location: [facility] L+D OR;  Service: Obstetrics    OTHER SURGICAL HISTORY Left     ATHROSCOPYKnee scoped       Prior to Admission Medications   Prior to Admission Medications   Prescriptions Last Dose Informant Patient Reported? Taking?   ibuprofen (ADVIL,MOTRIN) 600 MG tablet   No No   Sig: [IBUPROFEN (ADVIL,MOTRIN) 600 MG TABLET] Take 1 tablet (600 mg total) by mouth every 6 (six) hours as needed for pain.   oxyCODONE (ROXICODONE) 5 MG immediate release tablet   No No   Sig: [OXYCODONE (ROXICODONE) 5 MG IMMEDIATE RELEASE TABLET] Take 1 tablet (5 mg total) by mouth every 6 (six) hours as needed for pain.   prenatal #115-iron-folic acid 29 mg iron- 1 mg Chew   Yes No   Sig: [PRENATAL #115-IRON-FOLIC ACID 29 MG IRON- 1 MG  CHEW] Chew 1 tablet daily.      Facility-Administered Medications: None        Review of Systems    As per HPI otherwise 14 point review of systems is negative.    Physical Exam   Vital Signs: Temp: 99.1  F (37.3  C) Temp src: Temporal BP: (!) 146/90 Pulse: 93   Resp: 18 SpO2: 98 % O2 Device: None (Room air)    Weight: 145 lbs 0 oz    General: AAOx3, NAD, pleasant.  HEENT: NCAT, pupils are equal and round, anicteric, oral mucosa is moist.  Neck: Supple,  Cardiac: RRR.  No murmur. No rub or gallop.  Respiratory: CTAB, no crackles, wheezes, rales, or rhonchi  Abdomen: Soft, NT, ND, + bowel sounds  Extremity: No LE edema, distal pulses palpable.   Neuro: AAO x3, CN II-XII grossly intact, 5/5 motor function in bilateral upper and lower extremities.  Sensation is intact.  PERRL, EOMI,  Psych: Calm and cooperative.       Medical Decision Making       >60 MINUTES SPENT BY ME on the date of service doing chart review, history, exam, documentation & further activities per the note.      Data     I have personally reviewed the following data over the past 24 hrs:    6.8  \   13.1   / 339     141 103 8.5 /  93   4.0 24 0.59 \     Trop: <6 BNP: N/A     TSH: N/A T4: N/A A1C: 5.7 (H)       Imaging results reviewed over the past 24 hrs:   Recent Results (from the past 24 hour(s))   CTA Head Neck with Contrast    Narrative    EXAM: CTA HEAD NECK W CONTRAST  LOCATION: [facility]  DATE/TIME: 9/26/2024 6:41 PM CDT    INDICATION: TIA with bilateral vision loss and subsequent horizontal diplopia  COMPARISON: None.  CONTRAST: 75 ML ISOVUE 370  TECHNIQUE: Head and neck CT angiogram with IV contrast. Noncontrast head CT followed by axial helical CT images of the head and neck vessels obtained during the arterial phase of intravenous contrast administration. Axial 2D reconstructed images and   multiplanar 3D MIP reconstructed images of the head and neck vessels were performed by the technologist. Dose reduction  techniques were used. All stenosis measurements made according to NASCET criteria unless otherwise specified.    FINDINGS:   NONCONTRAST HEAD CT:   INTRACRANIAL CONTENTS: No intracranial hemorrhage, extraaxial collection, or mass effect.  No CT evidence of acute infarct. Normal parenchymal attenuation. Normal ventricles and sulci.     VISUALIZED ORBITS/SINUSES/MASTOIDS: No intraorbital abnormality. No significant paranasal sinus mucosal disease. No middle ear or mastoid effusion.    BONES/SOFT TISSUES: No acute abnormality.    HEAD CTA:  ANTERIOR CIRCULATION: No stenosis/occlusion, aneurysm, or high flow vascular malformation. Fetal origin of both posterior cerebral arteries from the anterior circulation.    POSTERIOR CIRCULATION: No stenosis/occlusion, aneurysm, or high flow vascular malformation. Congenitally small vertebrobasilar system.     DURAL VENOUS SINUSES: Expected enhancement of the major dural venous sinuses.    NECK CTA:  RIGHT CAROTID: No measurable stenosis or dissection.    LEFT CAROTID: No measurable stenosis or dissection.    VERTEBRAL ARTERIES: No focal stenosis or dissection. Balanced vertebral arteries.    AORTIC ARCH: Classic aortic arch anatomy with no significant stenosis at the origin of the great vessels.    NONVASCULAR STRUCTURES: Unremarkable.      Impression    IMPRESSION:   HEAD CT:  1.  No acute intracranial process.    HEAD CTA:   1.  No significant stenosis, aneurysm, or high flow vascular malformation identified.  2.  Variant [place] anatomy as above.    NECK CTA:  1.  Normal neck CTA.

## 2024-09-27 NOTE — PROGRESS NOTES
OT: Orders received. Chart reviewed and discussed with care team.  OT not indicated due to no skilled need for OT identified.  Defer discharge recommendations to RN/Care Team.  Will complete orders.

## 2024-09-27 NOTE — PROGRESS NOTES
Hospitalist follow up note:    Doing well this morning. Neuro symptoms resolved. Will await echo, neuro recs, hopefully discharge to home later today.    Db Allan DO   Pager #: [phone]

## 2024-09-27 NOTE — DISCHARGE SUMMARY
[facility] MEDICINE  DISCHARGE SUMMARY     Primary Care Physician: Jackelyn Doran  Admission Date: 9/26/2024   Discharge Provider: Db Allan DO Discharge Date: 9/27/2024   Diet:   Active Diet and Nourishment Order   Procedures    Regular Diet Adult    Diet       Code Status: Full Code   Activity: As tolerated        Condition at Discharge: Stable     REASON FOR PRESENTATION(See Admission Note for Details)   Acute bilateral vision loss, diplopia  PRINCIPAL & ACTIVE DISCHARGE DIAGNOSES   Complex migraine versus TIA  PENDING LABS     Unresulted Labs Ordered in the Past 30 Days of this Admission       No orders found for last 31 day(s).          PROCEDURES ( this hospitalization only)      RECOMMENDATIONS TO OUTPATIENT PROVIDER FOR F/U VISIT   Follow-up Appointments     Follow-up and recommended labs and tests       Follow up with primary care provider, Jackelyn Doran, within 7 days   for hospital follow- up.  No follow up labs or test are needed.          Continue with risk reduction, statin, aspirin  Monitor lipids, hemoglobin A1c as an outpatient  DISPOSITION   Home  SUMMARY OF HOSPITAL COURSE:    39-year-old female who is otherwise healthy presented with symptoms of acute vision loss in both eyes while at an ice cream shop with her children.  Symptoms lasted about 30 seconds and when she regained her vision she had some diplopia and possible peripheral vision loss.  Symptoms resolved by the time she was in the ED. Imaging including MRI, CTA head and neck, echo were all unremarkable. Seen by tele stroke team who recommended ASA and plavix for 3 weeks, aspirin to continue. Statin was recommended but deferred until pt can follow up with primary to discuss. Ambulatory cardiac monitor ordered and will be mailed to her house for monitoring for A fib.     She did have elevated lipids and mildly elevated A1c. These risk factors should also be discussed at follow up visit.    Discharge Medications with Med changes:     Current Discharge Medication List        START taking these medications    Details   aspirin (ASA) 81 MG chewable tablet Take 1 tablet (81 mg) by mouth daily.  Qty: 30 tablet, Refills: 0    Associated Diagnoses: TIA (transient ischemic attack)      clopidogrel (PLAVIX) 75 MG tablet Take 1 tablet (75 mg) by mouth or NG Tube daily for 21 days.  Qty: 21 tablet, Refills: 0    Associated Diagnoses: TIA (transient ischemic attack)           CONTINUE these medications which have NOT CHANGED    Details   escitalopram (LEXAPRO) 10 MG tablet Take 10 mg by mouth at bedtime.           Rationale for medication changes:    Started asa and plavix for possibility that this was TIA  Recommended statin but pt will discuss with her primary  Consults   Neurology  Anticoagulation Information    N/a  SIGNIFICANT IMAGING FINDINGS     Results for orders placed or performed during the hospital encounter of 09/26/24   CTA Head Neck with Contrast    Impression    IMPRESSION:   HEAD CT:  1.  No acute intracranial process.    HEAD CTA:   1.  No significant stenosis, aneurysm, or high flow vascular malformation identified.  2.  Variant [place] anatomy as above.    NECK CTA:  1.  Normal neck CTA.   MR Brain w/o & w Contrast    Impression    IMPRESSION:  1.  No acute intracranial process.  2.  Minimal presumed microvascular ischemic changes as detailed above.     SIGNIFICANT LABORATORY FINDINGS   See EMR  Discharge Orders        Reason for your hospital stay    Acute vision changes due to complex migraine or transient ischemic attack.     Follow-up and recommended labs and tests     Follow up with primary care provider, Jackelyn Doran, within 7 days for hospital follow- up.  No follow up labs or test are needed.     Activity    Your activity upon discharge: activity as tolerated. No travel for one month.     Diet    Follow this diet upon discharge: Current Diet:Orders Placed This  Encounter      Regular Diet Adult     Stroke Hospital Follow Up (for neurologist use only)    [facility] [place] will call you to coordinate care as prescribed by your provider. If you don t hear from a representative within 2 business days, please call [phone].       ROLANDA HAN MAIL OUT     Examination   Physical Exam   Temp:  [98  F (36.7  C)-99.1  F (37.3  C)] 98  F (36.7  C)  Pulse:  [] 76  Resp:  [16-24] 20  BP: (113-146)/(64-90) 113/64  SpO2:  [97 %-99 %] 98 %  Wt Readings from Last 1 Encounters:   09/26/24 65.8 kg (145 lb)       Subjective: feeling fine this morning. No longer having any vision issues, no headache.     Physical Exam:    Gen: no acute distress, comfortable  ENT: no scleral icterus  Pulm: lungs are clear without wheezing, crackles  CV: regular rate and rhythm  GI: abdomen is soft, non-tender, non-distended with active bowel sounds.  MSK: no significant peripheral pitting edema, no noticeable swelling/erythema or warmth of joints.  Derm: no rashes on examined areas of skin, no jaundice, skin is dry and warm.   Psych: appropriate affect       Please see EMR for more detailed significant labs, imaging, consultant notes etc.    IDb DO, personally saw the patient today and spent greater than 30 minutes discharging this patient.    Db Allan DO  [facility]    CC:Jackelyn Doran

## 2024-09-27 NOTE — PROGRESS NOTES
"PRIMARY DIAGNOSIS: \"GENERIC\" NURSING  OUTPATIENT/OBSERVATION GOALS TO BE MET BEFORE DISCHARGE:  ADLs back to baseline: {YES / NO:920285::\"Yes\"}    Activity and level of assistance: {OBSAmbulationStatus:150164}    Pain status: {OBSPainStatus:884347}    Return to near baseline physical activity: {YES / NO:129717::\"Yes\"}     Discharge Planner Nurse   Safe discharge environment identified: {YES / NO:633335::\"Yes\"}  Barriers to discharge: {YES / NO:888933::\"Yes\"}       Entered by: CLAUDIO VIGIL RN 09/27/2024 2:17 PM     Please review provider order for any additional goals.   Nurse to notify provider when observation goals have been met and patient is ready for discharge.  "

## 2024-09-27 NOTE — CONSULTS
"[facility]    Stroke Telephone Note    I was called by Mario Reyes on 09/26/24 regarding patient Margi Cisneros. The patient is a 39 year old female who had a 30 second episode of bilateral painless blindness followed by 5-10 minutes of double vision (unclear monocular or binocular). In the ED, her neurological exam is normal.     Vitals  BP: (!) 146/90   Pulse: 93   Resp: 18   Temp: 99.1  F (37.3  C)   Weight: 65.8 kg (145 lb)    Imaging Findings  CT head: no acute pathology  CTA head/neck: no stenosis or occlusion    Impression  Transient vision loss. The bilaterality argues against focal brain ischemia and makes more more suspicious of transient global hypoperfusion (e.g. a drop in BP or transient arrhythmia) However, the double vision is hard to explain. I can't rule out TIA.     Recommendations  - Use orderset: \"Ischemic Stroke Routine Admission\" or \"Ischemic Stroke No Thrombolytics/No Thrombectomy ICU Admission\"  - Place Neurology IP Stroke Consult order   - Neurochecks and Vital Signs every 4 hours   - Permissive HTN; goal SBP < 220 mmHg  - Daily aspirin 81 mg for secondary stroke prevention  - Plavix (clopidogrel) 300 mg PO loading dose x 1  - Plavix (clopidogrel) 75 mg PO Daily  - Statin: atorvastatin 40  - MRI Brain with and without contrast   - TTE (with Bubble Study if age 60 yrs or less)  - Telemetry, EKG  - Bedside Glucose Monitoring  - A1c, Lipid Panel, Troponin x 3  - PT/OT/SLP  - Stroke Education  - Euthermia, Euglycemia    My recommendations are based on the information provided over the phone by Margi Cisneros's in-person providers. They are not intended to replace the clinical judgment of her in-person providers. I was not requested to personally see or examine the patient at this time.         Sheila Gallardo MD, Msc, FAHA, FAAN   of Neurology  [facility]     09/26/2024 8:49 PM  To page me or covering stroke neurology team member, " "click here: [facility]COM  Choose \"On Call\" tab at top, then search dropdown box for \"Neurology Adult\" & press Enter, look for Neuro ICU/Stroke      "

## 2024-09-28 ENCOUNTER — ORDERS ONLY (AUTO-RELEASED) (OUTPATIENT)
Dept: EMERGENCY MEDICINE | Facility: CLINIC | Age: 39
End: 2024-09-28
Payer: COMMERCIAL

## 2024-09-28 ENCOUNTER — HEALTH MAINTENANCE LETTER (OUTPATIENT)
Age: 39
End: 2024-09-28

## 2024-09-28 DIAGNOSIS — H54.3 VISION LOSS, BILATERAL: ICD-10-CM

## 2024-09-28 DIAGNOSIS — H53.2 DOUBLE VISION: ICD-10-CM

## 2024-10-25 ENCOUNTER — HOSPITAL ENCOUNTER (OUTPATIENT)
Dept: ULTRASOUND IMAGING | Facility: CLINIC | Age: 39
Discharge: HOME | End: 2024-10-25
Attending: INTERNAL MEDICINE
Payer: COMMERCIAL

## 2024-10-25 ENCOUNTER — HOSPITAL ENCOUNTER (OUTPATIENT)
Dept: MRI IMAGING | Facility: CLINIC | Age: 39
Discharge: HOME | End: 2024-10-25
Attending: INTERNAL MEDICINE
Payer: COMMERCIAL

## 2024-10-25 DIAGNOSIS — E28.319 PREMATURE MENOPAUSE: ICD-10-CM

## 2024-10-25 PROCEDURE — 70553 MRI BRAIN STEM W/O & W/DYE: CPT

## 2024-10-25 PROCEDURE — 255N000002 HC RX 255 OP 636: Performed by: INTERNAL MEDICINE

## 2024-10-25 PROCEDURE — 76856 US EXAM PELVIC COMPLETE: CPT

## 2024-10-25 PROCEDURE — A9585 GADOBUTROL INJECTION: HCPCS | Performed by: INTERNAL MEDICINE

## 2024-10-25 PROCEDURE — 93248 EXT ECG>7D<15D REV&INTERPJ: CPT | Performed by: INTERNAL MEDICINE

## 2024-10-25 RX ORDER — GADOBUTROL 604.72 MG/ML
7 INJECTION INTRAVENOUS ONCE
Status: COMPLETED | OUTPATIENT
Start: 2024-10-25 | End: 2024-10-25

## 2024-10-25 RX ADMIN — GADOBUTROL 7 ML: 604.72 INJECTION INTRAVENOUS at 10:46

## 2024-11-26 NOTE — PROGRESS NOTES
"__________________________________________________________      [facility] Brenda Diaz   [phone]  __________________________________________________________         History of Present Illness   Chief Complaint: Patient presents with:  RECHECK: Last Thursday when she woke up she got a little room spinning and has now been consistently once a day for just a moment.      Margi Cisneros is a 39 year old female presenting for follow-up for possible TIA.     She was hospitalized at [facility] on 9/26/24. Prior to the hospital stay, she had a past medical history of migraine and anxiety.    She presented to the hospital with transient visual changes.  She was on ice cream shop with family when she developed sudden onset loss of vision in both eyes, as if \"a black curtain fell\".  This lasted approximately 30 seconds before resolving.  When vision returned, she describes seeing \"5-10\" images of the  and had spotty \"tunnels of black\" across her entire visual field. This lasted 5-10 minutes before improving. There was no associated focal weakness, numbness, speech difficulty, impaired gait. She denies associated headache.     MRI brain was negative for acute stroke.  Due to concern for possible complex migraine versus TIA, she was started on aspirin plus Plavix x 21 days.    Since leaving the hospital, she denies recurrent episodes of visual changes.  She has had episodes of brief dizziness on waking and other minor episodes of dizziness after bending over to tie shoes or do laundry.  She followed up with ophthalmology with reportedly normal ocular examination.  She shares that she has had 3 lifetime \"migraines\" that were so severe she had to lay down in the dark.  She does get more frequent minor headaches and these seem to be increasing in frequency recently.    She is following with her PCP and endocrinology for possible premature menopause, with possible plans to explore " "hormone replacement therapy.    Modified Breckinridge Scale  Score: 0-No symptoms    Stroke Evaluation Summarized:  New results resulted and reviewed by me today are in BOLD below.  I personally reviewed the following neuroimaging studies today and the comments above reflect my own personal interpretation of the images: images: CTA head/neck, MRI brain, and I also showed CTA, MRI to the patient myself today.    MRI and/or Head CT MRI Brain: No acute infarct    Intracranial Vasculature CTA Head: No LVO or significant stenosis   Cervical Vasculature CTA Neck: No LVO or significant stenosis      Echocardiogram Normal left ventricular size and systolic performance. EF is 60%. No significant valvular heart disease is identified on this study. Normal right ventricular size and systolic performance. Negative bubble. Left atrium and right atrium normal    EKG/Telemetry Sinus rhythm   Possible Left atrial enlargement   Cannot rule out Anterior infarct , age undetermined    Other Testing [place]iop[place]: no afib     Labs Lab Results   Component Value Date     (H) 09/26/2024    A1C 5.7 (H) 09/26/2024    CTROPT <6 09/26/2024    INR 1.04 09/27/2024               Home Medications     Current Outpatient Medications   Medication Sig Dispense Refill    aspirin (ASA) 81 MG chewable tablet Take 1 tablet (81 mg) by mouth daily. 30 tablet 0    escitalopram (LEXAPRO) 10 MG tablet Take 10 mg by mouth at bedtime.       No current facility-administered medications for this visit.            Physical Examination     Physical Exam    Estimated body mass index is 23.76 kg/m  as calculated from the following:    Height as of 9/26/24: 1.664 m (5' 5.5\").    Weight as of 9/26/24: 65.8 kg (145 lb).    /77   Pulse 80   SpO2 98%        General Exam  General: Sitting up in chair in no acute distress  Pulmonary:  no respiratory distress    Neurologic:  Mental Status:  alert, oriented x 3, follows commands, speech clear and fluent, naming and " repetition normal  Cranial Nerves:  visual fields intact, PERRL, EOMI with normal smooth pursuit, facial sensation intact and symmetric, facial movements symmetric, hearing not formally tested but intact to conversation, palate elevation symmetric and uvula midline, no dysarthria, shoulder shrug strong bilaterally, tongue protrusion midline  Motor:  normal muscle tone and bulk, no abnormal movements, able to move all limbs spontaneously, strength 5/5 throughout upper and lower extremities, no pronator drift  Reflexes:   deferred  Sensory:  light touch sensation intact and symmetric throughout upper and lower extremities, no extinction on double simultaneous stimulation   Coordination:  normal finger-to-nose and heel-to-shin bilaterally without dysmetria, rapid alternating movements symmetric  Station/Gait:  deferred           Screenings and Questionnaires:     Tobacco:    Tobacco Use      Smoking status: Never      Smokeless tobacco: Never      Sleep Apnea:       Depression:      11/27/2024     7:34 AM   PHQ-2 ( 1999 [employer])   Q1: Little interest or pleasure in doing things 0    Q2: Feeling down, depressed or hopeless 0    PHQ-2 Score 0    Q1: Little interest or pleasure in doing things Not at all   Q2: Feeling down, depressed or hopeless Not at all   PHQ-2 Score 0       Patient-reported       Stroke Recovery and Risk Factors:      11/27/2024     7:37 AM   Stroke Questionnaire   Residual effects: Any residual effects from stroke? No, I feel totally back to how I was before the stroke    Level of independence: Could you live alone? Yes    Quality of Life: Rating (0-100%) 100    Quality of Life: What work now or before stroke Employed full time    Medication: How do you take your meds Myself, from individual bottles    Risk Factor: Checking blood pressure at home Yes    Risk Factor: Usual blood pressure numbers 110-120/75-80    Risk Factor: Checking blood sugar at home No    Risk Factor: Second-hand smoke at home No   "  Risk Factor: How much caffeine per day 1 cup    Who completed this questionnaire? The patient independently        Patient-reported             Assessment and Plan       1.  Episode of transient binocular vision loss, with subsequent visual distortion and \"spotty\" vision. MRI brain negative for stroke. Symptoms would be atypical of transient ischemic attack. Concern for acephalgic migraine with visual aura vs less likely pre-syncopal event (absence of associated lightheadedness, dizziness, auditory changes).     - Currently on aspirin 81 mg.  She is tolerating well.  Reasonable to continue for now while undergoing additional evaluation of symptoms.  If felt to represent migraine aura, anticipate she could stop aspirin in the future.  - , follow-up with PCP for further management and primary stroke prevention  - Hgb A1c 5.7%, (goal <7% for secondary stroke prevention) follow-up with PCP regarding pre-diabetes   - Referral to headache clinic for further evaluation of symptoms and guidance on migraine management and HRT, has appointment scheduled for 12/12    - Return To stroke clinic as needed.    Stroke Education provided.  She will call us with any questions.  For any acute neurologic deficits she was advised to  go directly to the hospital rather than call the clinic.    Case reviewed with Dr. Tierra Mack, [facility]  Neurology  11/27/2024         ____________________________________________________________________    Billing:  Please note: for coding purposes this visit should be billed only as established and not new, since this patient was seen by my same subspecialty team (ealth Vascular Neurology) during the hospitalization that this visit is a follow up for.  I spent a total of 45 minutes on the day of the visit.  Time spent by me today doing chart review, history and exam, documentation and further activities per the note        "

## 2024-11-27 ENCOUNTER — OFFICE VISIT (OUTPATIENT)
Dept: NEUROLOGY | Facility: CLINIC | Age: 39
End: 2024-11-27
Payer: COMMERCIAL

## 2024-11-27 VITALS — DIASTOLIC BLOOD PRESSURE: 77 MMHG | OXYGEN SATURATION: 98 % | SYSTOLIC BLOOD PRESSURE: 114 MMHG | HEART RATE: 80 BPM

## 2024-11-27 DIAGNOSIS — H54.3 VISION LOSS, BILATERAL: ICD-10-CM

## 2024-11-27 DIAGNOSIS — H53.2 DOUBLE VISION: ICD-10-CM

## 2024-11-27 NOTE — LETTER
"11/27/2024      Margi Cisneros  [address] [address]      Dear Colleague,    Thank you for referring your patient, Margi Cisneros, to the [facility] NEUROLOGY CLINICS [facility]. Please see a copy of my visit note below.    __________________________________________________________      [facility]   [phone]  __________________________________________________________         History of Present Illness   Chief Complaint: Patient presents with:  RECHECK: Last Thursday when she woke up she got a little room spinning and has now been consistently once a day for just a moment.      Margi Cisneros is a 39 year old female presenting for follow-up for possible TIA.     She was hospitalized at [facility] on 9/26/24. Prior to the hospital stay, she had a past medical history of migraine and anxiety.    She presented to the hospital with transient visual changes.  She was on ice cream shop with family when she developed sudden onset loss of vision in both eyes, as if \"a black curtain fell\".  This lasted approximately 30 seconds before resolving.  When vision returned, she describes seeing \"5-10\" images of the  and had spotty \"tunnels of black\" across her entire visual field. This lasted 5-10 minutes before improving. There was no associated focal weakness, numbness, speech difficulty, impaired gait. She denies associated headache.     MRI brain was negative for acute stroke.  Due to concern for possible complex migraine versus TIA, she was started on aspirin plus Plavix x 21 days.    Since leaving the hospital, she denies recurrent episodes of visual changes.  She has had episodes of brief dizziness on waking and other minor episodes of dizziness after bending over to tie shoes or do laundry.  She followed up with ophthalmology with reportedly normal ocular examination.  She shares that she has had 3 lifetime \"migraines\" that were so severe " "she had to lay down in the dark.  She does get more frequent minor headaches and these seem to be increasing in frequency recently.    She is following with her PCP and endocrinology for possible premature menopause, with possible plans to explore hormone replacement therapy.    Modified [place] Scale  Score: 0-No symptoms    Stroke Evaluation Summarized:  New results resulted and reviewed by me today are in BOLD below.  I personally reviewed the following neuroimaging studies today and the comments above reflect my own personal interpretation of the images: images: CTA head/neck, MRI brain, and I also showed CTA, MRI to the patient myself today.    MRI and/or Head CT MRI Brain: No acute infarct    Intracranial Vasculature CTA Head: No LVO or significant stenosis   Cervical Vasculature CTA Neck: No LVO or significant stenosis      Echocardiogram Normal left ventricular size and systolic performance. EF is 60%. No significant valvular heart disease is identified on this study. Normal right ventricular size and systolic performance. Negative bubble. Left atrium and right atrium normal    EKG/Telemetry Sinus rhythm   Possible Left atrial enlargement   Cannot rule out Anterior infarct , age undetermined    Other Testing Ziopatch: no afib     Labs Lab Results   Component Value Date     (H) 09/26/2024    A1C 5.7 (H) 09/26/2024    CTROPT <6 09/26/2024    INR 1.04 09/27/2024               Home Medications     Current Outpatient Medications   Medication Sig Dispense Refill     aspirin (ASA) 81 MG chewable tablet Take 1 tablet (81 mg) by mouth daily. 30 tablet 0     escitalopram (LEXAPRO) 10 MG tablet Take 10 mg by mouth at bedtime.       No current facility-administered medications for this visit.            Physical Examination     Physical Exam    Estimated body mass index is 23.76 kg/m  as calculated from the following:    Height as of 9/26/24: 1.664 m (5' 5.5\").    Weight as of 9/26/24: 65.8 kg (145 lb).    BP " 114/77   Pulse 80   SpO2 98%        General Exam  General: Sitting up in chair in no acute distress  Pulmonary:  no respiratory distress    Neurologic:  Mental Status:  alert, oriented x 3, follows commands, speech clear and fluent, naming and repetition normal  Cranial Nerves:  visual fields intact, PERRL, EOMI with normal smooth pursuit, facial sensation intact and symmetric, facial movements symmetric, hearing not formally tested but intact to conversation, palate elevation symmetric and uvula midline, no dysarthria, shoulder shrug strong bilaterally, tongue protrusion midline  Motor:  normal muscle tone and bulk, no abnormal movements, able to move all limbs spontaneously, strength 5/5 throughout upper and lower extremities, no pronator drift  Reflexes:   deferred  Sensory:  light touch sensation intact and symmetric throughout upper and lower extremities, no extinction on double simultaneous stimulation   Coordination:  normal finger-to-nose and heel-to-shin bilaterally without dysmetria, rapid alternating movements symmetric  Station/Gait:  deferred           Screenings and Questionnaires:     Tobacco:    Tobacco Use      Smoking status: Never      Smokeless tobacco: Never      Sleep Apnea:       Depression:      11/27/2024     7:34 AM   PHQ-2 ( 1999 [employer])   Q1: Little interest or pleasure in doing things 0    Q2: Feeling down, depressed or hopeless 0    PHQ-2 Score 0    Q1: Little interest or pleasure in doing things Not at all   Q2: Feeling down, depressed or hopeless Not at all   PHQ-2 Score 0       Patient-reported       Stroke Recovery and Risk Factors:      11/27/2024     7:37 AM   Stroke Questionnaire   Residual effects: Any residual effects from stroke? No, I feel totally back to how I was before the stroke    Level of independence: Could you live alone? Yes    Quality of Life: Rating (0-100%) 100    Quality of Life: What work now or before stroke Employed full time    Medication: How do you take  "your meds Myself, from individual bottles    Risk Factor: Checking blood pressure at home Yes    Risk Factor: Usual blood pressure numbers 110-120/75-80    Risk Factor: Checking blood sugar at home No    Risk Factor: Second-hand smoke at home No    Risk Factor: How much caffeine per day 1 cup    Who completed this questionnaire? The patient independently        Patient-reported             Assessment and Plan       1.  Episode of transient binocular vision loss, with subsequent visual distortion and \"spotty\" vision. MRI brain negative for stroke. Symptoms would be atypical of transient ischemic attack. Concern for acephalgic migraine with visual aura vs less likely pre-syncopal event (absence of associated lightheadedness, dizziness, auditory changes).     - Currently on aspirin 81 mg.  She is tolerating well.  Reasonable to continue for now while undergoing additional evaluation of symptoms.  If felt to represent migraine aura, anticipate she could stop aspirin in the future.  - , follow-up with PCP for further management and primary stroke prevention  - Hgb A1c 5.7%, (goal <7% for secondary stroke prevention) follow-up with PCP regarding pre-diabetes   - Referral to headache clinic for further evaluation of symptoms and guidance on migraine management and HRT, has appointment scheduled for 12/12    - Return To stroke clinic as needed.    Stroke Education provided.  She will call us with any questions.  For any acute neurologic deficits she was advised to  go directly to the hospital rather than call the clinic.    Case reviewed with Dr. Meyers-Alexsandra Mack, [facility]  Neurology  11/27/2024         ____________________________________________________________________    Billing:  Please note: for coding purposes this visit should be billed only as established and not new, since this patient was seen by my same subspecialty team ([facility]) during the hospitalization that this visit " is a follow up for.  I spent a total of 45 minutes on the day of the visit.  Time spent by me today doing chart review, history and exam, documentation and further activities per the note          Again, thank you for allowing me to participate in the care of your patient.        Sincerely,        Fariha Mack, CNP

## 2024-11-27 NOTE — NURSING NOTE
Symptoms or concerns today: Some dizzy episodes the last couple days    Med comments: Current    Who the patient is here with today: Dad - former urology surgeon    Tanner Hernandez

## 2024-12-12 ENCOUNTER — OFFICE VISIT (OUTPATIENT)
Dept: NEUROLOGY | Facility: CLINIC | Age: 39
End: 2024-12-12
Payer: COMMERCIAL

## 2024-12-12 VITALS
DIASTOLIC BLOOD PRESSURE: 75 MMHG | OXYGEN SATURATION: 98 % | BODY MASS INDEX: 22.98 KG/M2 | HEIGHT: 66 IN | SYSTOLIC BLOOD PRESSURE: 113 MMHG | WEIGHT: 143 LBS | HEART RATE: 65 BPM

## 2024-12-12 DIAGNOSIS — H54.3 VISION LOSS, BILATERAL: Primary | ICD-10-CM

## 2024-12-12 ASSESSMENT — MIGRAINE DISABILITY ASSESSMENT (MIDAS)
HOW MANY DAYS DID YOU MISS WORK OR SCHOOL BECAUSE OF HEADACHES: 1
HOW MANY DAYS DID YOU NOT DO HOUSEWORK BECAUSE OF HEADACHES: 1
HOW MANY DAYS IN THE PAST 3 MONTHS HAVE YOU HAD A HEADACHE: 4
TOTAL SCORE: 4
HOW MANY DAYS WAS YOUR PRODUCTIVITY CUT IN HALF BECAUSE OF HEADACHES: 0
HOW MANY DAYS WAS HOUSEWORK PRODUCTIVITY CUT IN HALF DUE TO HEADACHES: 1
HOW OFTEN WERE SOCIAL ACTIVITIES MISSED DUE TO HEADACHES: 1
ON A SCALE FROM 0-10 ON AVERAGE HOW PAINFUL WERE HEADACHES: 8

## 2024-12-12 ASSESSMENT — HEADACHE IMPACT TEST (HIT 6)
WHEN YOU HAVE A HEADACHE HOW OFTEN DO YOU WISH YOU COULD LIE DOWN: SOMETIMES
HOW OFTEN DID HEADACHS LIMIT CONCENTRATION ON WORK OR DAILY ACTIVITY: NEVER
HOW OFTEN HAVE YOU FELT TOO TIRED TO WORK BECAUSE OF YOUR HEADACHES: NEVER
WHEN YOU HAVE HEADACHES HOW OFTEN IS THE PAIN SEVERE: RARELY
HOW OFTEN DO HEADACHES LIMIT YOUR DAILY ACTIVITIES: RARELY
HIT6 TOTAL SCORE: 44
HOW OFTEN HAVE YOU FELT FED UP OR IRRITATED BECAUSE OF YOUR HEADACHES: NEVER

## 2024-12-12 NOTE — LETTER
12/12/2024      Margi Cisneros  [address] [facility] [address]      Dear Colleague,    Thank you for referring your patient, Margi Cisneros, to the [facility] NEUROLOGY CLINICS [facility]. Please see a copy of my visit note below.    [facility]   Headache Neurology Consult  December 12, 2024     Margi Cisneros MRN# [id number]   YOB: 1985 Age: 39 year old     Requesting provider: Fariha Mack CNP         Assessment and Recommendations:     Margi Cisneros is a 39 year old female who presents for evaluation of bilateral sudden onset vision loss lasting 30 minutes, then with gradual resolution of vision and no recurrences since  that time. No association with headache, though she does have active migraine without aura. Discussed at the length the possible differential and that stroke has been ruled out, TIA is unlikely. She has no papilledema on my exam or reported to her by outside optometry. She has no constitutional symptoms or recurrent episodes and this could be consistent with possible migrainous aura, but does not meet 2 of 3 criteria for it and it has not been recurrent, so could not label as such at this time. She is in the perimenopausal period, so it is possible that migraine features are changing, but has no prior history of migraine with aura at all.    Recommend CT head venogram for evaluation of possible CVST or transverse venous stenosis.  If this imaging returns normal, would recommend observation.  Would consider stopping any hormonal supplementation if it exacerbates this condition if she is eventually started on that therapy.    Will place review of CT head venogram in [facility]. No return visit at this time, though would be happy to see her back if symptoms recur. If unilateral vision loss recurs, or other atypical features from the first episode (other than mild to moderate headache consistent with migraine) would have her report to  the emergency department.    Total time spent today was 68 minutes in chart review, history, exam and counseling.      Anny De Los Santos MD  Neurology            Chief Complaint:     Chief Complaint   Patient presents with     Headache     Migraine - ref from Fariha del castillo/ stroke           History is obtained from the patient and medical record.      Margi Cisneros is a 39 year old female whose symptoms began in Sept and was standing in a line for ice cream with 5 kids and then suddenly her vision was lost bilaterally (saw black). It gradually returned after about 30 seconds.   As it returned she could see 3 of the person at the counter. She was trying to rub her eyes thinking it was associated with contact lenses. Turning her head led her to see black spots. She had a PA friend with her and she had no headache associated with it. He performed an initial assessment with him there. She called her  and the kids went with him. She called her eye doctor who determined that this was unlikely ophthalmologic emergency, but these she called her PCP and was instructed to go to the ED. She went and stayed overnight. She did not have headaches associated with this before or after. No presyncope. No other symptoms. She was not disoriented but did panic after she could not see. Vision was gradually returning.     She had an MRI and was normal. There was no signs of stroke, but was treated for TIA empirically and is still on baby aspirin. She is no longer on plavix. She has had a few moments of dizziness. That was present on awakening (not after rolling over). It had woken her up a few times. She has not had any episodes since thanksgiving week without visual symptoms.   She has had lab work confirming perimenopause.    She has a history of migraine. She has had an increase in headaches over the last 8-9 months and this has been more hormonal. She has had 2-3 moments in the last 2 months where she has had severe headaches.  She has not had blurred vision associated with those.   She has had 1 headache in the past month.   They have been temporal in localization if mild and do not pulse. The severe ones are pressure and holocephalic. There is a pulsatile quality. The severe ones are worsened by activity.  There is phonophobia, photophobia, and 2 incidences in the past with a nausea and no vomiting. No blurring, no diplopia. No language difficulties, slurred speech, no dizziness, no motor or sensory auras. No aura at all.   No onset of headaches with lying flat and are usually during the day in onset. She did not ever had a thunderclap headache and have been gradual in onset.     She has had no induction of headaches with cough, valsalva or bending forwards. She was dizzy and if bending forwards would worsen that.    She did eventually see optometry and her eye testing with [facility] was normal. She had no pain with this. It never recurred.     No fevers, she rarely gets night sweats, loss of appetite, weight loss (+weight gain).  No headaches awakening from sleep.             Past Medical History:   No other medical problems          Past Surgical History:     Past Surgical History:   Procedure Laterality Date      SECTION x2 N/A 2018    Procedure: REPEAT  SECTION;  Surgeon: Tim Spann MD;  Location: [facility] OR;  Service: Obstetrics,     [facility]             Social History:     Social History     Socioeconomic History     Marital status:      Spouse name: Not on file     Number of children: Not on file     Years of education: Not on file     Highest education level: Not on file   Occupational History     Not on file   Tobacco Use     Smoking status: Never     Smokeless tobacco: Never   Substance and Sexual Activity     Alcohol use: No     Drug use: No     Sexual activity: Yes     Partners: Male     Birth control/protection: None   Other Topics  "Concern     Not on file   Social History Narrative     Not on file     Social Drivers of Health     Financial Resource Strain: Not on file   Food Insecurity: Not on file   Transportation Needs: Not on file   Physical Activity: Not on file   Stress: Not on file   Social Connections: Not on file   Interpersonal Safety: Not on file   Housing Stability: Not on file             Family History:     Family History   Problem Relation Age of Onset     Clotting Disorder Father         Discovered after Pulmonary Embolism             Allergies:    No Known Allergies          Medications:   Acute headache medications:  Ibuprofen - helps and completely remits the headaches    Preventative headache medications:      Current Outpatient Medications:      aspirin (ASA) 81 MG chewable tablet, Take 1 tablet (81 mg) by mouth daily., Disp: 30 tablet, Rfl: 0     escitalopram (LEXAPRO) 10 MG tablet, Take 10 mg by mouth at bedtime., Disp: , Rfl:           Physical Exam:   /75   Pulse 65   Ht 1.664 m (5' 5.5\")   Wt 64.9 kg (143 lb)   SpO2 98%   BMI 23.43 kg/m     Physical Exam:   Neurologic:   Mental Status Exam: Alert, awake and oriented to situation. No dysarthria. Speech of normal fluency.   Cranial Nerves: Fundoscopic exam with clear disc margins bilaterally. PERRLA, EOMs intact, no nystagmus, facial movements symmetric, facial sensation intact to light touch, hearing intact to conversation, trapezius and SCMs 5/5 bilaterally, tongue midline and fully mobile. No tongue atrophy.    Motor: Normal tone in all four extremities, no atrophy. 5/5 strength bilaterally in shoulder abduction, elbow extensors and flexors, wrist extensors and flexors, hip flexors, knee extensors and flexors, dorsi- and plantarflexion. No tremors or abnormal movements noted.   Sensory: Sensation intact to pinprick and vibration sensation on arms and legs bilaterally.    Coordination: Finger-nose-finger intact bilaterally.  Rapidly alternating movements " intact bilaterally in the upper extremities.  Normal finger tapping bilaterally.  Intact heel-shin bilaterally.    Reflexes: 2+ and symmetric in triceps, biceps, brachioradialis, patellar, Achilles, and plantars downgoing bilaterally.   Gait: Normal gait.  Able to toe and heel walk.  Tandem gait normal.  Head: Normocephalic, atraumatic.   Eyes: No conjunctival injection, no scleral icterus.           Data:   EXAM: MR BRAIN W/O & W CONTRAST  10/25/2024 10:46 AM      HISTORY: Premature menopause        COMPARISON: 9/26/2024     TECHNIQUE: Multiplanar, multisequence MR imaging of the head and  pituitary gland without intravenous contrast     CONTRAST: 7 mL GADAVIST.     FINDINGS  No sellar mass. Midline pituitary stalk. Normal T1 hyperintense  neurohypophysis. Intact, nondisplaced optic chiasm. Normal cavernous  carotid vascular flow-voids.     No mass effect, midline shift, or intracranial hemorrhage. No acute  infarct or hydrocephalus. Preserved intravascular flow voids.     Normal skull marrow signal. No substantial paranasal sinus mucosal  disease. Clear mastoid air cells. Nonfocal skull base and upper  cervical spinal structures. The orbits are normal.                                                                      IMPRESSION:   1. No evidence of pituitary gland mass or hemorrhage.  2. No superimposed acute intracranial process.      SHASTA ROWELL DO         SYSTEM ID:  [id number]         Again, thank you for allowing me to participate in the care of your patient.        Sincerely,        Anny De Los Santos MD

## 2024-12-12 NOTE — PROGRESS NOTES
[facility]   Headache Neurology Consult  December 12, 2024     Margi Cisneros MRN# [id number]   YOB: 1985 Age: 39 year old     Requesting provider: Fariha Mack CNP         Assessment and Recommendations:     Margi Cisneros is a 39 year old female who presents for evaluation of bilateral sudden onset vision loss lasting 30 minutes, then with gradual resolution of vision and no recurrences since  that time. No association with headache, though she does have active migraine without aura. Discussed at the length the possible differential and that stroke has been ruled out, TIA is unlikely. She has no papilledema on my exam or reported to her by outside optometry. She has no constitutional symptoms or recurrent episodes and this could be consistent with possible migrainous aura, but does not meet 2 of 3 criteria for it and it has not been recurrent, so could not label as such at this time. She is in the perimenopausal period, so it is possible that migraine features are changing, but has no prior history of migraine with aura at all.    Recommend CT head venogram for evaluation of possible CVST or transverse venous stenosis.  If this imaging returns normal, would recommend observation.  Would consider stopping any hormonal supplementation if it exacerbates this condition if she is eventually started on that therapy.    Will place review of CT head venogram in [facility]. No return visit at this time, though would be happy to see her back if symptoms recur. If unilateral vision loss recurs, or other atypical features from the first episode (other than mild to moderate headache consistent with migraine) would have her report to the emergency department.    Total time spent today was 68 minutes in chart review, history, exam and counseling.      Anny De Los Santos MD  Neurology            Chief Complaint:     Chief Complaint   Patient presents with    Headache     Migraine -  ref from [facility] del [facility]/ stroke           History is obtained from the patient and medical record.      Margi Cisneros is a 39 year old female whose symptoms began in Sept and was standing in a line for ice cream with 5 kids and then suddenly her vision was lost bilaterally (saw black). It gradually returned after about 30 seconds.   As it returned she could see 3 of the person at the counter. She was trying to rub her eyes thinking it was associated with contact lenses. Turning her head led her to see black spots. She had a PA friend with her and she had no headache associated with it. He performed an initial assessment with him there. She called her  and the kids went with him. She called her eye doctor who determined that this was unlikely ophthalmologic emergency, but these she called her PCP and was instructed to go to the ED. She went and stayed overnight. She did not have headaches associated with this before or after. No presyncope. No other symptoms. She was not disoriented but did panic after she could not see. Vision was gradually returning.     She had an MRI and was normal. There was no signs of stroke, but was treated for TIA empirically and is still on baby aspirin. She is no longer on plavix. She has had a few moments of dizziness. That was present on awakening (not after rolling over). It had woken her up a few times. She has not had any episodes since thanksgiving week without visual symptoms.   She has had lab work confirming perimenopause.    She has a history of migraine. She has had an increase in headaches over the last 8-9 months and this has been more hormonal. She has had 2-3 moments in the last 2 months where she has had severe headaches. She has not had blurred vision associated with those.   She has had 1 headache in the past month.   They have been temporal in localization if mild and do not pulse. The severe ones are pressure and holocephalic. There is a pulsatile quality. The severe  ones are worsened by activity.  There is phonophobia, photophobia, and 2 incidences in the past with a nausea and no vomiting. No blurring, no diplopia. No language difficulties, slurred speech, no dizziness, no motor or sensory auras. No aura at all.   No onset of headaches with lying flat and are usually during the day in onset. She did not ever had a thunderclap headache and have been gradual in onset.     She has had no induction of headaches with cough, valsalva or bending forwards. She was dizzy and if bending forwards would worsen that.    She did eventually see optometry and her eye testing with [facility] was normal. She had no pain with this. It never recurred.     No fevers, she rarely gets night sweats, loss of appetite, weight loss (+weight gain).  No headaches awakening from sleep.             Past Medical History:   No other medical problems          Past Surgical History:     Past Surgical History:   Procedure Laterality Date     SECTION x2 N/A 2018    Procedure: REPEAT  SECTION;  Surgeon: Tim Spann MD;  Location: [facility] OR;  Service: Obstetrics,    OTHER SURGICAL HISTORY Left     [facility]             Social History:     Social History     Socioeconomic History    Marital status:      Spouse name: Not on file    Number of children: Not on file    Years of education: Not on file    Highest education level: Not on file   Occupational History    Not on file   Tobacco Use    Smoking status: Never    Smokeless tobacco: Never   Substance and Sexual Activity    Alcohol use: No    Drug use: No    Sexual activity: Yes     Partners: Male     Birth control/protection: None   Other Topics Concern    Not on file   Social History Narrative    Not on file     Social Drivers of Health     Financial Resource Strain: Not on file   Food Insecurity: Not on file   Transportation Needs: Not on file   Physical Activity: Not on file   Stress: Not on file   Social  "Connections: Not on file   Interpersonal Safety: Not on file   Housing Stability: Not on file             Family History:     Family History   Problem Relation Age of Onset    Clotting Disorder Father         Discovered after Pulmonary Embolism             Allergies:    No Known Allergies          Medications:   Acute headache medications:  Ibuprofen - helps and completely remits the headaches    Preventative headache medications:      Current Outpatient Medications:     aspirin (ASA) 81 MG chewable tablet, Take 1 tablet (81 mg) by mouth daily., Disp: 30 tablet, Rfl: 0    escitalopram (LEXAPRO) 10 MG tablet, Take 10 mg by mouth at bedtime., Disp: , Rfl:           Physical Exam:   /75   Pulse 65   Ht 1.664 m (5' 5.5\")   Wt 64.9 kg (143 lb)   SpO2 98%   BMI 23.43 kg/m     Physical Exam:   Neurologic:   Mental Status Exam: Alert, awake and oriented to situation. No dysarthria. Speech of normal fluency.   Cranial Nerves: Fundoscopic exam with clear disc margins bilaterally. PERRLA, EOMs intact, no nystagmus, facial movements symmetric, facial sensation intact to light touch, hearing intact to conversation, trapezius and SCMs 5/5 bilaterally, tongue midline and fully mobile. No tongue atrophy.    Motor: Normal tone in all four extremities, no atrophy. 5/5 strength bilaterally in shoulder abduction, elbow extensors and flexors, wrist extensors and flexors, hip flexors, knee extensors and flexors, dorsi- and plantarflexion. No tremors or abnormal movements noted.   Sensory: Sensation intact to pinprick and vibration sensation on arms and legs bilaterally.    Coordination: Finger-nose-finger intact bilaterally.  Rapidly alternating movements intact bilaterally in the upper extremities.  Normal finger tapping bilaterally.  Intact heel-shin bilaterally.    Reflexes: 2+ and symmetric in triceps, biceps, brachioradialis, patellar, Achilles, and plantars downgoing bilaterally.   Gait: Normal gait.  Able to toe and " heel walk.  Tandem gait normal.  Head: Normocephalic, atraumatic.   Eyes: No conjunctival injection, no scleral icterus.           Data:   EXAM: MR BRAIN W/O & W CONTRAST  10/25/2024 10:46 AM      HISTORY: Premature menopause        COMPARISON: 9/26/2024     TECHNIQUE: Multiplanar, multisequence MR imaging of the head and  pituitary gland without intravenous contrast     CONTRAST: 7 mL GADAVIST.     FINDINGS  No sellar mass. Midline pituitary stalk. Normal T1 hyperintense  neurohypophysis. Intact, nondisplaced optic chiasm. Normal cavernous  carotid vascular flow-voids.     No mass effect, midline shift, or intracranial hemorrhage. No acute  infarct or hydrocephalus. Preserved intravascular flow voids.     Normal skull marrow signal. No substantial paranasal sinus mucosal  disease. Clear mastoid air cells. Nonfocal skull base and upper  cervical spinal structures. The orbits are normal.                                                                      IMPRESSION:   1. No evidence of pituitary gland mass or hemorrhage.  2. No superimposed acute intracranial process.      SHASTA ROWELL DO         SYSTEM ID:  [id number]

## 2024-12-12 NOTE — NURSING NOTE
"Margi Cisneros is a 39 year old female who presents for:  Chief Complaint   Patient presents with    Headache     Migraine - ref from Fariha w/ stroke      Patient here regarding migraine/HA. Stroke was ruled out after extensive testing and medication usage per stroke team.   - does not get HA really. Have them but feels they are hormonal, in early menopause. On average 1x a month she would consider a headache. Fariha wanted to get into HA clinic prior to potentially getting into hormone therapy.     Initial Vitals:  /75   Pulse 65   Ht 1.664 m (5' 5.5\")   Wt 64.9 kg (143 lb)   SpO2 98%   BMI 23.43 kg/m   Estimated body mass index is 23.43 kg/m  as calculated from the following:    Height as of this encounter: 1.664 m (5' 5.5\").    Weight as of this encounter: 64.9 kg (143 lb).. Body surface area is 1.73 meters squared. BP completed using cuff size: marialuisa Saleh CMA    "